# Patient Record
Sex: MALE | Race: WHITE | NOT HISPANIC OR LATINO | Employment: OTHER | URBAN - METROPOLITAN AREA
[De-identification: names, ages, dates, MRNs, and addresses within clinical notes are randomized per-mention and may not be internally consistent; named-entity substitution may affect disease eponyms.]

---

## 2019-12-08 ENCOUNTER — HOSPITAL ENCOUNTER (INPATIENT)
Facility: HOSPITAL | Age: 79
LOS: 8 days | Discharge: HOME HEALTH | DRG: 291 | End: 2019-12-17
Attending: EMERGENCY MEDICINE | Admitting: INTERNAL MEDICINE
Payer: OTHER GOVERNMENT

## 2019-12-08 DIAGNOSIS — R06.03 ACUTE RESPIRATORY DISTRESS: ICD-10-CM

## 2019-12-08 DIAGNOSIS — I50.9 CONGESTIVE HEART FAILURE, UNSPECIFIED HF CHRONICITY, UNSPECIFIED HEART FAILURE TYPE: ICD-10-CM

## 2019-12-08 DIAGNOSIS — R09.02 HYPOXIA: ICD-10-CM

## 2019-12-08 DIAGNOSIS — J96.21 ACUTE ON CHRONIC RESPIRATORY FAILURE WITH HYPOXIA: ICD-10-CM

## 2019-12-08 DIAGNOSIS — R07.9 CHEST PAIN: ICD-10-CM

## 2019-12-08 DIAGNOSIS — I48.91 ATRIAL FIBRILLATION WITH RVR: Primary | ICD-10-CM

## 2019-12-08 DIAGNOSIS — J90 PLEURAL EFFUSION: ICD-10-CM

## 2019-12-08 PROCEDURE — 84484 ASSAY OF TROPONIN QUANT: CPT

## 2019-12-08 PROCEDURE — 93010 ELECTROCARDIOGRAM REPORT: CPT | Mod: ,,, | Performed by: INTERNAL MEDICINE

## 2019-12-08 PROCEDURE — 85025 COMPLETE CBC W/AUTO DIFF WBC: CPT

## 2019-12-08 PROCEDURE — 93010 EKG 12-LEAD: ICD-10-PCS | Mod: ,,, | Performed by: INTERNAL MEDICINE

## 2019-12-08 PROCEDURE — 80053 COMPREHEN METABOLIC PANEL: CPT

## 2019-12-08 PROCEDURE — 85379 FIBRIN DEGRADATION QUANT: CPT

## 2019-12-08 PROCEDURE — 96374 THER/PROPH/DIAG INJ IV PUSH: CPT | Mod: 59

## 2019-12-08 PROCEDURE — 93005 ELECTROCARDIOGRAM TRACING: CPT

## 2019-12-08 PROCEDURE — 99291 CRITICAL CARE FIRST HOUR: CPT | Mod: 25

## 2019-12-08 PROCEDURE — 96366 THER/PROPH/DIAG IV INF ADDON: CPT

## 2019-12-08 PROCEDURE — 96365 THER/PROPH/DIAG IV INF INIT: CPT

## 2019-12-08 PROCEDURE — 83880 ASSAY OF NATRIURETIC PEPTIDE: CPT

## 2019-12-08 RX ORDER — DILTIAZEM HYDROCHLORIDE 5 MG/ML
10 INJECTION INTRAVENOUS
Status: COMPLETED | OUTPATIENT
Start: 2019-12-09 | End: 2019-12-09

## 2019-12-08 RX ORDER — DILTIAZEM HCL 1 MG/ML
5 INJECTION, SOLUTION INTRAVENOUS CONTINUOUS
Status: DISCONTINUED | OUTPATIENT
Start: 2019-12-09 | End: 2019-12-09

## 2019-12-09 PROBLEM — E78.5 HLD (HYPERLIPIDEMIA): Status: ACTIVE | Noted: 2019-12-09

## 2019-12-09 PROBLEM — J90 PLEURAL EFFUSION, BILATERAL: Status: ACTIVE | Noted: 2019-12-09

## 2019-12-09 PROBLEM — I10 ESSENTIAL HYPERTENSION: Status: ACTIVE | Noted: 2019-12-09

## 2019-12-09 PROBLEM — I50.9 ACUTE ON CHRONIC CONGESTIVE HEART FAILURE: Status: ACTIVE | Noted: 2019-12-09

## 2019-12-09 PROBLEM — I48.91 ATRIAL FIBRILLATION WITH RVR: Status: ACTIVE | Noted: 2019-12-09

## 2019-12-09 PROBLEM — J96.21 ACUTE ON CHRONIC RESPIRATORY FAILURE WITH HYPOXIA: Status: ACTIVE | Noted: 2019-12-09

## 2019-12-09 LAB
ALBUMIN SERPL BCP-MCNC: 3.3 G/DL (ref 3.5–5.2)
ALLENS TEST: ABNORMAL
ALLENS TEST: ABNORMAL
ALP SERPL-CCNC: 92 U/L (ref 55–135)
ALT SERPL W/O P-5'-P-CCNC: 23 U/L (ref 10–44)
ANION GAP SERPL CALC-SCNC: 9 MMOL/L (ref 8–16)
AORTIC VALVE REGURGITATION: ABNORMAL
AORTIC VALVE STENOSIS: ABNORMAL
AST SERPL-CCNC: 19 U/L (ref 10–40)
BASOPHILS # BLD AUTO: 0.03 K/UL (ref 0–0.2)
BASOPHILS NFR BLD: 0.2 % (ref 0–1.9)
BILIRUB SERPL-MCNC: 0.5 MG/DL (ref 0.1–1)
BNP SERPL-MCNC: 221 PG/ML (ref 0–99)
BUN SERPL-MCNC: 17 MG/DL (ref 8–23)
CALCIUM SERPL-MCNC: 9.1 MG/DL (ref 8.7–10.5)
CHLORIDE SERPL-SCNC: 97 MMOL/L (ref 95–110)
CO2 SERPL-SCNC: 36 MMOL/L (ref 23–29)
CREAT SERPL-MCNC: 0.8 MG/DL (ref 0.5–1.4)
D DIMER PPP IA.FEU-MCNC: 1.06 MG/L FEU
DELSYS: ABNORMAL
DELSYS: ABNORMAL
DIASTOLIC DYSFUNCTION: YES
DIFFERENTIAL METHOD: ABNORMAL
EOSINOPHIL # BLD AUTO: 0 K/UL (ref 0–0.5)
EOSINOPHIL NFR BLD: 0.1 % (ref 0–8)
ERYTHROCYTE [DISTWIDTH] IN BLOOD BY AUTOMATED COUNT: 13.1 % (ref 11.5–14.5)
EST. GFR  (AFRICAN AMERICAN): >60 ML/MIN/1.73 M^2
EST. GFR  (NON AFRICAN AMERICAN): >60 ML/MIN/1.73 M^2
ESTIMATED PA SYSTOLIC PRESSURE: 57.25
FIO2: 50
FIO2: 50
FLOW: 15
FLOW: 40
GLUCOSE SERPL-MCNC: 136 MG/DL (ref 70–110)
HCO3 UR-SCNC: 43.7 MMOL/L (ref 24–28)
HCO3 UR-SCNC: 43.8 MMOL/L (ref 24–28)
HCT VFR BLD AUTO: 39.6 % (ref 40–54)
HGB BLD-MCNC: 11.4 G/DL (ref 14–18)
IMM GRANULOCYTES # BLD AUTO: 0.07 K/UL (ref 0–0.04)
IMM GRANULOCYTES NFR BLD AUTO: 0.5 % (ref 0–0.5)
LACTATE SERPL-SCNC: 0.7 MMOL/L (ref 0.5–2.2)
LYMPHOCYTES # BLD AUTO: 0.7 K/UL (ref 1–4.8)
LYMPHOCYTES NFR BLD: 5.3 % (ref 18–48)
MCH RBC QN AUTO: 30.6 PG (ref 27–31)
MCHC RBC AUTO-ENTMCNC: 28.8 G/DL (ref 32–36)
MCV RBC AUTO: 107 FL (ref 82–98)
MITRAL VALVE REGURGITATION: ABNORMAL
MODE: ABNORMAL
MODE: ABNORMAL
MONOCYTES # BLD AUTO: 1 K/UL (ref 0.3–1)
MONOCYTES NFR BLD: 7.3 % (ref 4–15)
NEUTROPHILS # BLD AUTO: 11.7 K/UL (ref 1.8–7.7)
NEUTROPHILS NFR BLD: 86.6 % (ref 38–73)
NRBC BLD-RTO: 0 /100 WBC
PCO2 BLDA: 110 MMHG (ref 35–45)
PCO2 BLDA: 83.7 MMHG (ref 35–45)
PH SMN: 7.21 [PH] (ref 7.35–7.45)
PH SMN: 7.33 [PH] (ref 7.35–7.45)
PLATELET # BLD AUTO: 174 K/UL (ref 150–350)
PMV BLD AUTO: 10.6 FL (ref 9.2–12.9)
PO2 BLDA: 61 MMHG (ref 80–100)
PO2 BLDA: 85 MMHG (ref 80–100)
POC BE: 16 MMOL/L
POC BE: 18 MMOL/L
POC SATURATED O2: 87 % (ref 95–100)
POC SATURATED O2: 92 % (ref 95–100)
POTASSIUM SERPL-SCNC: 4.4 MMOL/L (ref 3.5–5.1)
PROCALCITONIN SERPL IA-MCNC: 0.05 NG/ML
PROT SERPL-MCNC: 7.5 G/DL (ref 6–8.4)
RBC # BLD AUTO: 3.72 M/UL (ref 4.6–6.2)
RETIRED EF AND QEF - SEE NOTES: 55 (ref 55–65)
SAMPLE: ABNORMAL
SAMPLE: ABNORMAL
SITE: ABNORMAL
SITE: ABNORMAL
SODIUM SERPL-SCNC: 142 MMOL/L (ref 136–145)
TRICUSPID VALVE REGURGITATION: ABNORMAL
TROPONIN I SERPL DL<=0.01 NG/ML-MCNC: <0.006 NG/ML (ref 0–0.03)
WBC # BLD AUTO: 13.5 K/UL (ref 3.9–12.7)

## 2019-12-09 PROCEDURE — 87040 BLOOD CULTURE FOR BACTERIA: CPT | Mod: 59

## 2019-12-09 PROCEDURE — 21400001 HC TELEMETRY ROOM

## 2019-12-09 PROCEDURE — 94640 AIRWAY INHALATION TREATMENT: CPT

## 2019-12-09 PROCEDURE — 99223 1ST HOSP IP/OBS HIGH 75: CPT | Mod: ,,, | Performed by: INTERNAL MEDICINE

## 2019-12-09 PROCEDURE — 99900035 HC TECH TIME PER 15 MIN (STAT)

## 2019-12-09 PROCEDURE — 94761 N-INVAS EAR/PLS OXIMETRY MLT: CPT

## 2019-12-09 PROCEDURE — 25000003 PHARM REV CODE 250: Performed by: EMERGENCY MEDICINE

## 2019-12-09 PROCEDURE — 99223 PR INITIAL HOSPITAL CARE,LEVL III: ICD-10-PCS | Mod: ,,, | Performed by: INTERNAL MEDICINE

## 2019-12-09 PROCEDURE — 25000003 PHARM REV CODE 250: Performed by: NURSE PRACTITIONER

## 2019-12-09 PROCEDURE — 83605 ASSAY OF LACTIC ACID: CPT

## 2019-12-09 PROCEDURE — 93306 2D ECHO WITH COLOR FLOW DOPPLER: ICD-10-PCS | Mod: 26,,, | Performed by: INTERNAL MEDICINE

## 2019-12-09 PROCEDURE — 63600175 PHARM REV CODE 636 W HCPCS: Performed by: FAMILY MEDICINE

## 2019-12-09 PROCEDURE — 63600175 PHARM REV CODE 636 W HCPCS: Performed by: NURSE PRACTITIONER

## 2019-12-09 PROCEDURE — 25000242 PHARM REV CODE 250 ALT 637 W/ HCPCS: Performed by: FAMILY MEDICINE

## 2019-12-09 PROCEDURE — 36415 COLL VENOUS BLD VENIPUNCTURE: CPT

## 2019-12-09 PROCEDURE — 25500020 PHARM REV CODE 255: Performed by: EMERGENCY MEDICINE

## 2019-12-09 PROCEDURE — 36600 WITHDRAWAL OF ARTERIAL BLOOD: CPT

## 2019-12-09 PROCEDURE — 27100092 HC HIGH FLOW DELIVERY CANNULA

## 2019-12-09 PROCEDURE — 27000221 HC OXYGEN, UP TO 24 HOURS

## 2019-12-09 PROCEDURE — 63600175 PHARM REV CODE 636 W HCPCS: Performed by: EMERGENCY MEDICINE

## 2019-12-09 PROCEDURE — 27100171 HC OXYGEN HIGH FLOW UP TO 24 HOURS

## 2019-12-09 PROCEDURE — 84145 PROCALCITONIN (PCT): CPT

## 2019-12-09 PROCEDURE — 25000242 PHARM REV CODE 250 ALT 637 W/ HCPCS: Performed by: NURSE PRACTITIONER

## 2019-12-09 PROCEDURE — 82803 BLOOD GASES ANY COMBINATION: CPT

## 2019-12-09 PROCEDURE — 93306 TTE W/DOPPLER COMPLETE: CPT | Mod: 26,,, | Performed by: INTERNAL MEDICINE

## 2019-12-09 PROCEDURE — 93306 TTE W/DOPPLER COMPLETE: CPT

## 2019-12-09 RX ORDER — IPRATROPIUM BROMIDE 0.5 MG/2.5ML
0.5 SOLUTION RESPIRATORY (INHALATION) EVERY 6 HOURS
Status: DISCONTINUED | OUTPATIENT
Start: 2019-12-09 | End: 2019-12-09

## 2019-12-09 RX ORDER — GUAIFENESIN 600 MG/1
600 TABLET, EXTENDED RELEASE ORAL DAILY
COMMUNITY

## 2019-12-09 RX ORDER — IPRATROPIUM BROMIDE AND ALBUTEROL SULFATE 2.5; .5 MG/3ML; MG/3ML
3 SOLUTION RESPIRATORY (INHALATION)
Status: DISCONTINUED | OUTPATIENT
Start: 2019-12-09 | End: 2019-12-12

## 2019-12-09 RX ORDER — TAMSULOSIN HYDROCHLORIDE 0.4 MG/1
0.4 CAPSULE ORAL DAILY
Status: DISCONTINUED | OUTPATIENT
Start: 2019-12-09 | End: 2019-12-17 | Stop reason: HOSPADM

## 2019-12-09 RX ORDER — FINASTERIDE 5 MG/1
5 TABLET, FILM COATED ORAL DAILY
COMMUNITY

## 2019-12-09 RX ORDER — ATORVASTATIN CALCIUM 40 MG/1
40 TABLET, FILM COATED ORAL DAILY
Status: DISCONTINUED | OUTPATIENT
Start: 2019-12-09 | End: 2019-12-17 | Stop reason: HOSPADM

## 2019-12-09 RX ORDER — DILTIAZEM HCL/D5W 125 MG/125
12.5 PLASTIC BAG, INJECTION (ML) INTRAVENOUS CONTINUOUS
Status: DISCONTINUED | OUTPATIENT
Start: 2019-12-09 | End: 2019-12-10

## 2019-12-09 RX ORDER — DABIGATRAN ETEXILATE 150 MG/1
150 CAPSULE ORAL 2 TIMES DAILY
Status: DISCONTINUED | OUTPATIENT
Start: 2019-12-09 | End: 2019-12-17 | Stop reason: HOSPADM

## 2019-12-09 RX ORDER — DABIGATRAN ETEXILATE 150 MG/1
150 CAPSULE ORAL 2 TIMES DAILY
COMMUNITY

## 2019-12-09 RX ORDER — IPRATROPIUM BROMIDE AND ALBUTEROL SULFATE 2.5; .5 MG/3ML; MG/3ML
3 SOLUTION RESPIRATORY (INHALATION) EVERY 6 HOURS PRN
Status: DISCONTINUED | OUTPATIENT
Start: 2019-12-09 | End: 2019-12-17 | Stop reason: HOSPADM

## 2019-12-09 RX ORDER — ATORVASTATIN CALCIUM 40 MG/1
40 TABLET, FILM COATED ORAL DAILY
COMMUNITY

## 2019-12-09 RX ORDER — OMEPRAZOLE 20 MG/1
20 CAPSULE, DELAYED RELEASE ORAL DAILY
COMMUNITY

## 2019-12-09 RX ORDER — TIOTROPIUM BROMIDE 18 UG/1
2.5 CAPSULE ORAL; RESPIRATORY (INHALATION) DAILY
COMMUNITY

## 2019-12-09 RX ORDER — FUROSEMIDE 10 MG/ML
40 INJECTION INTRAMUSCULAR; INTRAVENOUS 2 TIMES DAILY
Status: DISCONTINUED | OUTPATIENT
Start: 2019-12-09 | End: 2019-12-17

## 2019-12-09 RX ORDER — PANTOPRAZOLE SODIUM 40 MG/1
40 TABLET, DELAYED RELEASE ORAL DAILY
Status: DISCONTINUED | OUTPATIENT
Start: 2019-12-09 | End: 2019-12-17 | Stop reason: HOSPADM

## 2019-12-09 RX ORDER — BUDESONIDE 0.5 MG/2ML
0.5 INHALANT ORAL EVERY 12 HOURS
Status: DISCONTINUED | OUTPATIENT
Start: 2019-12-09 | End: 2019-12-17 | Stop reason: HOSPADM

## 2019-12-09 RX ORDER — MULTIVITAMIN
1 TABLET ORAL DAILY
COMMUNITY

## 2019-12-09 RX ORDER — METOPROLOL SUCCINATE 50 MG/1
100 TABLET, EXTENDED RELEASE ORAL DAILY
Status: DISCONTINUED | OUTPATIENT
Start: 2019-12-09 | End: 2019-12-17 | Stop reason: HOSPADM

## 2019-12-09 RX ORDER — TIOTROPIUM BROMIDE 18 UG/1
2.5 CAPSULE ORAL; RESPIRATORY (INHALATION) DAILY
Status: DISCONTINUED | OUTPATIENT
Start: 2019-12-09 | End: 2019-12-09

## 2019-12-09 RX ORDER — FINASTERIDE 5 MG/1
5 TABLET, FILM COATED ORAL DAILY
Status: DISCONTINUED | OUTPATIENT
Start: 2019-12-09 | End: 2019-12-17 | Stop reason: HOSPADM

## 2019-12-09 RX ORDER — METOPROLOL SUCCINATE 100 MG/1
100 TABLET, EXTENDED RELEASE ORAL DAILY
COMMUNITY

## 2019-12-09 RX ORDER — FUROSEMIDE 10 MG/ML
40 INJECTION INTRAMUSCULAR; INTRAVENOUS
Status: COMPLETED | OUTPATIENT
Start: 2019-12-09 | End: 2019-12-09

## 2019-12-09 RX ORDER — TAMSULOSIN HYDROCHLORIDE 0.4 MG/1
0.4 CAPSULE ORAL DAILY
COMMUNITY

## 2019-12-09 RX ADMIN — METHYLPREDNISOLONE SODIUM SUCCINATE 80 MG: 40 INJECTION, POWDER, FOR SOLUTION INTRAMUSCULAR; INTRAVENOUS at 10:12

## 2019-12-09 RX ADMIN — FUROSEMIDE 40 MG: 10 INJECTION, SOLUTION INTRAMUSCULAR; INTRAVENOUS at 08:12

## 2019-12-09 RX ADMIN — METOPROLOL SUCCINATE 100 MG: 50 TABLET, FILM COATED, EXTENDED RELEASE ORAL at 08:12

## 2019-12-09 RX ADMIN — Medication 12.5 MG/HR: at 07:12

## 2019-12-09 RX ADMIN — Medication 5 MG/HR: at 12:12

## 2019-12-09 RX ADMIN — AZITHROMYCIN MONOHYDRATE 500 MG: 500 INJECTION, POWDER, LYOPHILIZED, FOR SOLUTION INTRAVENOUS at 12:12

## 2019-12-09 RX ADMIN — IOHEXOL 100 ML: 350 INJECTION, SOLUTION INTRAVENOUS at 01:12

## 2019-12-09 RX ADMIN — ATORVASTATIN CALCIUM 40 MG: 40 TABLET, FILM COATED ORAL at 08:12

## 2019-12-09 RX ADMIN — IPRATROPIUM BROMIDE AND ALBUTEROL SULFATE 3 ML: .5; 3 SOLUTION RESPIRATORY (INHALATION) at 11:12

## 2019-12-09 RX ADMIN — FUROSEMIDE 40 MG: 10 INJECTION, SOLUTION INTRAMUSCULAR; INTRAVENOUS at 12:12

## 2019-12-09 RX ADMIN — DILTIAZEM HYDROCHLORIDE 10 MG: 5 INJECTION INTRAVENOUS at 12:12

## 2019-12-09 RX ADMIN — METHYLPREDNISOLONE SODIUM SUCCINATE 80 MG: 40 INJECTION, POWDER, FOR SOLUTION INTRAMUSCULAR; INTRAVENOUS at 09:12

## 2019-12-09 RX ADMIN — IPRATROPIUM BROMIDE AND ALBUTEROL SULFATE 3 ML: .5; 3 SOLUTION RESPIRATORY (INHALATION) at 04:12

## 2019-12-09 RX ADMIN — BUDESONIDE 0.5 MG: 0.5 INHALANT RESPIRATORY (INHALATION) at 07:12

## 2019-12-09 RX ADMIN — IPRATROPIUM BROMIDE AND ALBUTEROL SULFATE 3 ML: .5; 3 SOLUTION RESPIRATORY (INHALATION) at 07:12

## 2019-12-09 RX ADMIN — FINASTERIDE 5 MG: 5 TABLET, FILM COATED ORAL at 08:12

## 2019-12-09 RX ADMIN — DABIGATRAN ETEXILATE MESYLATE 150 MG: 150 CAPSULE ORAL at 08:12

## 2019-12-09 RX ADMIN — IPRATROPIUM BROMIDE 0.5 MG: 0.5 SOLUTION RESPIRATORY (INHALATION) at 07:12

## 2019-12-09 RX ADMIN — TAMSULOSIN HYDROCHLORIDE 0.4 MG: 0.4 CAPSULE ORAL at 08:12

## 2019-12-09 RX ADMIN — Medication 12.5 MG/HR: at 02:12

## 2019-12-09 RX ADMIN — FUROSEMIDE 40 MG: 10 INJECTION, SOLUTION INTRAMUSCULAR; INTRAVENOUS at 05:12

## 2019-12-09 RX ADMIN — PANTOPRAZOLE SODIUM 40 MG: 40 TABLET, DELAYED RELEASE ORAL at 08:12

## 2019-12-09 RX ADMIN — Medication 12.5 MG/HR: at 10:12

## 2019-12-09 RX ADMIN — THERA TABS 1 TABLET: TAB at 08:12

## 2019-12-09 RX ADMIN — DABIGATRAN ETEXILATE MESYLATE 150 MG: 150 CAPSULE ORAL at 09:12

## 2019-12-09 NOTE — H&P
[facility] Hospital Medicine  History & Physical    Patient Name: Nicolás Marin  MRN: [id number]  Admission Date: 12/8/2019  Attending Physician: Neftali Mendez MD   Primary Care Provider: Yuli Lau MD         Patient information was obtained from patient, relative(s) and ER records.     Subjective:     Principal Problem: Acute on chronic respiratory failure with hypoxia    Chief Complaint:   Chief Complaint   Patient presents with    Shortness of Breath     +dfficulty breathing at home. on home oxygen 2L, Given one neb and NGT sub PTA        HPI: Mr Marin is a 79 year old male with PMHx of COPD on home O 2, HTN, A Fib on Pradaxa who presented to [facility] with complaints of progressive shortness of breath that has continue to worsen.  Denies associated symptoms chest pain, shortness of breath, fever, chills, nausea or vomiting. [facility] and is a poor historian. EKG in the Emergency Room showed A Fib with RVR rate in of 135, he was started of Cardizem drip in the Emergency Room. CT of chest showed bilateral plural effusions, Rt greater that left. . Troponin negative. He is a full code, granddaughter Katharine, is surrogate decision make. He is being admitted to telemety under the care of Hospital Medicine.      Past Medical History:   Diagnosis Date    Anticoagulant long-term use     Atrial fibrillation     COPD (chronic obstructive pulmonary disease)     History of cardioversion     Hypertension        History reviewed. No pertinent surgical history.    Review of patient's allergies indicates:   Allergen Reactions    Penicillins        No current facility-administered medications on file prior to encounter.      Current Outpatient Medications on File Prior to Encounter   Medication Sig    albuterol sulfate (PROAIR DIGIHALER) 90 mcg/actuation aebs Inhale into the lungs.    atorvastatin (LIPITOR) 40 MG tablet Take 40 mg by mouth once daily.    dabigatran  "etexilate (PRADAXA) 150 mg Cap Take 150 mg by mouth 2 (two) times daily. "Do NOT break, chew, or open capsules."     finasteride (PROSCAR) 5 mg tablet Take 5 mg by mouth once daily.    guaiFENesin (MUCINEX) 600 mg 12 hr tablet Take 600 mg by mouth once daily.    ipratropium-albuterol (COMBIVENT RESPIMAT)  mcg/actuation inhaler Inhale 1 puff into the lungs every 6 (six) hours as needed for Wheezing. Rescue    metoprolol succinate (TOPROL-XL) 100 MG 24 hr tablet Take 100 mg by mouth once daily.    mometasone (ASMANEX TWISTHALER) 220 mcg/ actuation (30) inhaler Inhale 2 puffs into the lungs once daily. Controller    multivitamin (THERAGRAN) per tablet Take 1 tablet by mouth once daily.    omeprazole (PRILOSEC) 20 MG capsule Take 20 mg by mouth once daily.    tamsulosin (FLOMAX) 0.4 mg Cap Take 0.4 mg by mouth once daily.    tiotropium (SPIRIVA) 18 mcg inhalation capsule Inhale 2.5 mcg into the lungs once daily. Controller     Family History     Family history is unknown by patient.        Tobacco Use    Smoking status: Former Smoker     Packs/day: 2.00   Substance and Sexual Activity    Alcohol use: Not Currently    Drug use: Never    Sexual activity: Not on file     Review of Systems   Constitutional: Positive for fatigue. Negative for chills, diaphoresis and fever.   HENT: Negative for congestion, ear discharge, rhinorrhea, sinus pressure, sore throat and trouble swallowing.    Eyes: Negative for discharge and visual disturbance.   Respiratory: Positive for cough and shortness of breath. Negative for apnea, choking, chest tightness, wheezing and stridor.    Cardiovascular: Negative for chest pain, palpitations and leg swelling.   Gastrointestinal: Negative for abdominal distention, abdominal pain, diarrhea, nausea and vomiting.   Endocrine: Negative for cold intolerance and heat intolerance.   Genitourinary: Negative for dysuria, frequency and hematuria.   Musculoskeletal: Negative for arthralgias, " back pain, myalgias and neck pain.   Skin: Negative for pallor and rash.   Neurological: Positive for weakness. Negative for dizziness, seizures, syncope and headaches.   Psychiatric/Behavioral: Negative for agitation, confusion and sleep disturbance.     Objective:     Vital Signs (Most Recent):  Pulse: 93 (12/09/19 0217)  Resp: (!) 24 (12/09/19 0217)  BP: 128/73 (12/09/19 0217)  SpO2: (!) 90 % (12/09/19 0217) Vital Signs (24h Range):  Pulse:  [] 93  Resp:  [21-36] 24  SpO2:  [69 %-100 %] 90 %  BP: (128-139)/(58-97) 128/73        There is no height or weight on file to calculate BMI.    Physical Exam   Constitutional: No distress.   HENT:   Head: Normocephalic and atraumatic.   Eyes: Right eye exhibits no discharge. Left eye exhibits no discharge.   Neck: No JVD present.   Cardiovascular: An irregular rhythm present. Exam reveals no gallop and no friction rub.   No murmur heard.  Pulmonary/Chest: No stridor. No respiratory distress. He has decreased breath sounds in the right lower field and the left lower field. He has no wheezes. He has rhonchi. He has no rales. He exhibits no tenderness.   Abdominal: He exhibits no distension and no mass. There is no tenderness. There is no rebound and no guarding.   Musculoskeletal: He exhibits no edema or deformity.   Neurological: He is alert.   Skin: He is not diaphoretic.   Nursing note and vitals reviewed.                Results for orders placed or performed during the hospital encounter of 12/08/19   CBC auto differential   Result Value Ref Range    WBC 13.50 (H) 3.90 - 12.70 K/uL    RBC 3.72 (L) 4.60 - 6.20 M/uL    Hemoglobin 11.4 (L) 14.0 - 18.0 g/dL    Hematocrit 39.6 (L) 40.0 - 54.0 %    Mean Corpuscular Volume 107 (H) 82 - 98 fL    Mean Corpuscular Hemoglobin 30.6 27.0 - 31.0 pg    Mean Corpuscular Hemoglobin Conc 28.8 (L) 32.0 - 36.0 g/dL    RDW 13.1 11.5 - 14.5 %    Platelets 174 150 - 350 K/uL    MPV 10.6 9.2 - 12.9 fL    Immature Granulocytes 0.5 0.0 - 0.5  %    Gran # (ANC) 11.7 (H) 1.8 - 7.7 K/uL    Immature Grans (Abs) 0.07 (H) 0.00 - 0.04 K/uL    Lymph # 0.7 (L) 1.0 - 4.8 K/uL    Mono # 1.0 0.3 - 1.0 K/uL    Eos # 0.0 0.0 - 0.5 K/uL    Baso # 0.03 0.00 - 0.20 K/uL    nRBC 0 0 /100 WBC    Gran% 86.6 (H) 38.0 - 73.0 %    Lymph% 5.3 (L) 18.0 - 48.0 %    Mono% 7.3 4.0 - 15.0 %    Eosinophil% 0.1 0.0 - 8.0 %    Basophil% 0.2 0.0 - 1.9 %    Differential Method Automated    Comprehensive metabolic panel   Result Value Ref Range    Sodium 142 136 - 145 mmol/L    Potassium 4.4 3.5 - 5.1 mmol/L    Chloride 97 95 - 110 mmol/L    CO2 36 (H) 23 - 29 mmol/L    Glucose 136 (H) 70 - 110 mg/dL    BUN, Bld 17 8 - 23 mg/dL    Creatinine 0.8 0.5 - 1.4 mg/dL    Calcium 9.1 8.7 - 10.5 mg/dL    Total Protein 7.5 6.0 - 8.4 g/dL    Albumin 3.3 (L) 3.5 - 5.2 g/dL    Total Bilirubin 0.5 0.1 - 1.0 mg/dL    Alkaline Phosphatase 92 55 - 135 U/L    AST 19 10 - 40 U/L    ALT 23 10 - 44 U/L    Anion Gap 9 8 - 16 mmol/L    eGFR if African American >60 >60 mL/min/1.73 m^2    eGFR if non African American >60 >60 mL/min/1.73 m^2   Troponin I #1   Result Value Ref Range    Troponin I <0.006 0.000 - 0.026 ng/mL   B-Type natriuretic peptide (BNP)   Result Value Ref Range     (H) 0 - 99 pg/mL   D dimer, quantitative   Result Value Ref Range    D-Dimer 1.06 (H) <0.50 mg/L FEU   Lactic acid, plasma   Result Value Ref Range    Lactate (Lactic Acid) 0.7 0.5 - 2.2 mmol/L       Significant Imaging:    Imaging Results          CTA Chest Non-Coronary (PE Study) (In process)                X-Ray Chest AP Portable (Preliminary result)  Result time 12/09/19 00:34:12    ED Interpretation by Jackson Burns MD (12/09/19 00:34:12, [facility], Emergency Medicine)    Vascular Congestion                            Assessment/Plan:     Acute on chronic respiratory failure with hypoxia  Admit to Telemetry   Pt on home O 2 at 2-3 L   Venti mask at 50 %, wean as tolerated  Nebs   Probable  related to CHF and plural effusions   Lasix 40 mg IV BID        Acute on chronic congestive heart failure  Strict I & O   Daily weights  Fluid restriction   Low sodium diet   Check 2 D Echo             Atrial fibrillation with RVR  Admitted with A Fib with RVR   Cardizem drip   Continue B blocker   Cardiology Consult   Continue Pradaxa         Pleural effusion, bilateral  CT shows bilateral plural effusion, more right than left   Lasix 40 mg IV BID       HLD (hyperlipidemia)  Continue Statin       Essential hypertension  Continue B blocker         VTE Risk Mitigation (From admission, onward)         Ordered     dabigatran etexilate capsule 150 mg  2 times daily      12/09/19 0341                   Phi Santizo NP  Department of Hospital Medicine   [facility]

## 2019-12-09 NOTE — NURSING
Patient AAOx4. Vital signs stable. Oxygen saturation low at 88. Nonrebreather mask applied. Cardizem infusion in place. Cardiac monitor applied and verified with the monitor tech. Will continue to monitor closely.

## 2019-12-09 NOTE — ED PROVIDER NOTES
SCRIBE #1 NOTE: I, Janice Frankel, am scribing for, and in the presence of, Jackson Burns MD. I have scribed the entire note.       History     Chief Complaint   Patient presents with    Shortness of Breath     +dfficulty breathing at home. on home oxygen 2L, Given one neb and NGT sub PTA     Review of patient's allergies indicates:   Allergen Reactions    Penicillins          History of Present Illness     HPI    12/9/2019, 12:02 AM  History obtained from the patient      History of Present Illness: Nicolás Marin is a 79 y.o. male patient with a PMHx of A fib, who presents to the Emergency Department for evaluation of SOB which onset gradually 3 months ago. Pt states sxs have been present for almost 2 years, but sxs have recently worsened. Symptoms are constant and moderate in severity. No mitigating or exacerbating factors reported. No associated sxs . Patient denies any fever/chills, fatigue, congestion, sore throat, cough, CP, palpitations, n/v/d, dysuria, hematuria, back pain, rash, HA, dizziness, bruises and blds easily, and all other sxs at this time. No prior Tx reported. No further complaints or concerns at this time.     Arrival mode: AASI    PCP: Yuli Lau MD        Past Medical History:  Past Medical History:   Diagnosis Date    Anticoagulant long-term use     Atrial fibrillation     COPD (chronic obstructive pulmonary disease)     History of cardioversion     Hypertension        Past Surgical History:  History reviewed. No pertinent surgical history.      Family History:  Family History   Problem Relation Age of Onset    No Known Problems Mother     No Known Problems Father     No Known Problems Sister     No Known Problems Brother        Social History:  Social History     Tobacco Use    Smoking status: Former Smoker     Packs/day: 2.00    Smokeless tobacco: Never Used   Substance and Sexual Activity    Alcohol use: Not Currently    Drug use: Never    Sexual  activity: Not Currently        Review of Systems     Review of Systems   Constitutional: Negative for chills, fatigue and fever.   HENT: Negative for congestion and sore throat.    Respiratory: Positive for shortness of breath. Negative for cough.    Cardiovascular: Negative for chest pain and palpitations.   Gastrointestinal: Negative for diarrhea, nausea and vomiting.   Genitourinary: Negative for dysuria and hematuria.   Musculoskeletal: Negative for back pain.   Skin: Negative for rash.   Neurological: Negative for dizziness and headaches.   Hematological: Does not bruise/bleed easily.   All other systems reviewed and are negative.     Physical Exam     Initial Vitals   BP Pulse Resp Temp SpO2   12/08/19 2340 12/08/19 2340 12/08/19 2340 12/09/19 0301 12/08/19 2340   (!) 139/96 106 (!) 21 99.1 °F (37.3 °C) 96 %      MAP       --                 Physical Exam  Nursing Notes and Vital Signs Reviewed.  Constitutional: Patient is in moderate distress. Well-developed and well-nourished.  Head: Atraumatic. Normocephalic.  Eyes: PERRL. EOM intact. Conjunctivae are not pale. No scleral icterus.  ENT: Mucous membranes are moist. Oropharynx is clear and symmetric.    Neck: Supple. Full ROM. No lymphadenopathy.  Cardiovascular: Irregularly irregular. No murmurs, rubs, or gallops. Distal pulses are 2+ and symmetric.  Pulmonary/Chest: Moderate respiratory distress. Crackles bilaterally.   Abdominal: Soft and non-distended.  There is no tenderness.  No rebound, guarding, or rigidity. Good bowel sounds.  Genitourinary: No CVA tenderness  Musculoskeletal: Moves all extremities. No obvious deformities. +2 edema. No calf tenderness.  Skin: Warm and dry.  Neurological:  Alert, awake, and appropriate.  Normal speech.  No acute focal neurological deficits are appreciated.  Psychiatric: Normal affect. Good eye contact. Appropriate in content.     ED Course   Critical Care  Date/Time: 12/9/2019 1:02 AM  Performed by: Jackson RAMIREZ  "MD Grant  Authorized by: Jackson Burns MD   Direct patient critical care time: 13 minutes  Additional history critical care time: 7 minutes  Ordering / reviewing critical care time: 8 minutes  Documentation critical care time: 6 minutes  Consulting other physicians critical care time: 6 minutes  Total critical care time (exclusive of procedural time) : 40 minutes  Critical care time was exclusive of separately billable procedures and treating other patients and teaching time.  Critical care was necessary to treat or prevent imminent or life-threatening deterioration of the following conditions: cardiac failure.  Critical care was time spent personally by me on the following activities: blood draw for specimens, development of treatment plan with patient or surrogate, discussions with consultants, evaluation of patient's response to treatment, examination of patient, obtaining history from patient or surrogate, ordering and performing treatments and interventions, ordering and review of laboratory studies, ordering and review of radiographic studies, pulse oximetry, re-evaluation of patient's condition and review of old charts.        ED Vital Signs:  Vitals:    12/09/19 0400 12/09/19 0459 12/09/19 0506 12/09/19 0729   BP: (!) 144/73  (!) 143/72    Pulse: 83  70 68   Resp: (!) 28  (!) 22 20   Temp:   98.1 °F (36.7 °C)    TempSrc:       SpO2: (!) 91%  (!) 90% (!) 92%   Weight:  93.8 kg (206 lb 12.7 oz)     Height:  6' 1" (1.854 m)      12/09/19 0739 12/09/19 0945 12/09/19 1000 12/09/19 1149   BP: (!) 142/70      Pulse: 76 70 77 76   Resp: 20 20  20   Temp: 97.6 °F (36.4 °C)      TempSrc: Oral      SpO2: (!) 93%   (!) 92%   Weight:       Height:        12/09/19 1201 12/09/19 1215 12/09/19 1317 12/09/19 1400   BP: 109/68      Pulse: 81 71     Resp: 16      Temp: 97.2 °F (36.2 °C)      TempSrc: Oral      SpO2: (!) 93% (!) 86% (!) 85% 98%   Weight:       Height:        12/09/19 1505 12/09/19 1526 12/09/19 " 1536   BP:  (!) 118/58    Pulse: 79 81    Resp:  16    Temp:  97.5 °F (36.4 °C)    TempSrc:  Oral    SpO2: 96% 98% (!) 92%   Weight:      Height:          Abnormal Lab Results:  Labs Reviewed   CBC W/ AUTO DIFFERENTIAL - Abnormal; Notable for the following components:       Result Value    WBC 13.50 (*)     RBC 3.72 (*)     Hemoglobin 11.4 (*)     Hematocrit 39.6 (*)     Mean Corpuscular Volume 107 (*)     Mean Corpuscular Hemoglobin Conc 28.8 (*)     Gran # (ANC) 11.7 (*)     Immature Grans (Abs) 0.07 (*)     Lymph # 0.7 (*)     Gran% 86.6 (*)     Lymph% 5.3 (*)     All other components within normal limits   COMPREHENSIVE METABOLIC PANEL - Abnormal; Notable for the following components:    CO2 36 (*)     Glucose 136 (*)     Albumin 3.3 (*)     All other components within normal limits   B-TYPE NATRIURETIC PEPTIDE - Abnormal; Notable for the following components:     (*)     All other components within normal limits   D DIMER, QUANTITATIVE - Abnormal; Notable for the following components:    D-Dimer 1.06 (*)     All other components within normal limits   TROPONIN I   LACTIC ACID, PLASMA        All Lab Results:  Results for orders placed or performed during the hospital encounter of 12/08/19   CBC auto differential   Result Value Ref Range    WBC 13.50 (H) 3.90 - 12.70 K/uL    RBC 3.72 (L) 4.60 - 6.20 M/uL    Hemoglobin 11.4 (L) 14.0 - 18.0 g/dL    Hematocrit 39.6 (L) 40.0 - 54.0 %    Mean Corpuscular Volume 107 (H) 82 - 98 fL    Mean Corpuscular Hemoglobin 30.6 27.0 - 31.0 pg    Mean Corpuscular Hemoglobin Conc 28.8 (L) 32.0 - 36.0 g/dL    RDW 13.1 11.5 - 14.5 %    Platelets 174 150 - 350 K/uL    MPV 10.6 9.2 - 12.9 fL    Immature Granulocytes 0.5 0.0 - 0.5 %    Gran # (ANC) 11.7 (H) 1.8 - 7.7 K/uL    Immature Grans (Abs) 0.07 (H) 0.00 - 0.04 K/uL    Lymph # 0.7 (L) 1.0 - 4.8 K/uL    Mono # 1.0 0.3 - 1.0 K/uL    Eos # 0.0 0.0 - 0.5 K/uL    Baso # 0.03 0.00 - 0.20 K/uL    nRBC 0 0 /100 WBC    Gran% 86.6 (H)  38.0 - 73.0 %    Lymph% 5.3 (L) 18.0 - 48.0 %    Mono% 7.3 4.0 - 15.0 %    Eosinophil% 0.1 0.0 - 8.0 %    Basophil% 0.2 0.0 - 1.9 %    Differential Method Automated    Comprehensive metabolic panel   Result Value Ref Range    Sodium 142 136 - 145 mmol/L    Potassium 4.4 3.5 - 5.1 mmol/L    Chloride 97 95 - 110 mmol/L    CO2 36 (H) 23 - 29 mmol/L    Glucose 136 (H) 70 - 110 mg/dL    BUN, Bld 17 8 - 23 mg/dL    Creatinine 0.8 0.5 - 1.4 mg/dL    Calcium 9.1 8.7 - 10.5 mg/dL    Total Protein 7.5 6.0 - 8.4 g/dL    Albumin 3.3 (L) 3.5 - 5.2 g/dL    Total Bilirubin 0.5 0.1 - 1.0 mg/dL    Alkaline Phosphatase 92 55 - 135 U/L    AST 19 10 - 40 U/L    ALT 23 10 - 44 U/L    Anion Gap 9 8 - 16 mmol/L    eGFR if African American >60 >60 mL/min/1.73 m^2    eGFR if non African American >60 >60 mL/min/1.73 m^2   Troponin I #1   Result Value Ref Range    Troponin I <0.006 0.000 - 0.026 ng/mL   B-Type natriuretic peptide (BNP)   Result Value Ref Range     (H) 0 - 99 pg/mL   D dimer, quantitative   Result Value Ref Range    D-Dimer 1.06 (H) <0.50 mg/L FEU   Lactic acid, plasma   Result Value Ref Range    Lactate (Lactic Acid) 0.7 0.5 - 2.2 mmol/L   Procalcitonin   Result Value Ref Range    Procalcitonin 0.05 <0.25 ng/mL   ISTAT PROCEDURE   Result Value Ref Range    POC PH 7.206 (LL) 7.35 - 7.45    POC PCO2 110.0 (HH) 35 - 45 mmHg    POC PO2 85 80 - 100 mmHg    POC HCO3 43.7 (H) 24 - 28 mmol/L    POC BE 16 -2 to 2 mmol/L    POC SATURATED O2 92 (L) 95 - 100 %    Sample ARTERIAL     Site LR     Allens Test Pass     DelSys Venti Mask     Mode SPONT     Flow 15     FiO2 50    ISTAT PROCEDURE   Result Value Ref Range    POC PH 7.327 (L) 7.35 - 7.45    POC PCO2 83.7 (HH) 35 - 45 mmHg    POC PO2 61 (L) 80 - 100 mmHg    POC HCO3 43.8 (H) 24 - 28 mmol/L    POC BE 18 -2 to 2 mmol/L    POC SATURATED O2 87 (L) 95 - 100 %    Sample ARTERIAL     Site LR     Allens Test Pass     DelSys Nasal Can     Mode SPONT     Flow 40     FiO2 50         Imaging Results:  Imaging Results          CTA Chest Non-Coronary (PE Study) (Final result)  Result time 12/09/19 06:26:00    Final result by Ramiro Driscoll MD (12/09/19 06:26:00)                 Impression:      No evidence of pulmonary embolism.  Findings suggestive of pulmonary arterial hypertension.    Moderate bilateral pleural effusions with diffuse interstitial and ground-glass opacities throughout the lungs suggesting pulmonary edema.  Superimposed infection not excluded with more consolidative process at the lung bases.    Cardiomegaly.    See additional findings above    All CT scans at this facility use dose modulation, iterative reconstruction and/or weight based dosing when appropriate to reduce radiation dose to as low as reasonably achievable.      Electronically signed by: Ramiro Driscoll MD  Date:    12/09/2019  Time:    06:26             Narrative:    EXAMINATION:  CTA CHEST NON CORONARY    CLINICAL HISTORY:  Chest pain and shortness of breath    TECHNIQUE:  After the intravenous administration of 100 cc of Omni 350 nonionic contrast using CT pulmonary angio technique, 1.25  mm axial images were acquired using helical CT technique from the lung apices through costophrenic sulci.  Axial mips, sagittal and coronal reformats were also submitted for interpretation.    COMPARISON:  Chest x-ray dated 12/09/2019    FINDINGS:  -Pulmonary arteries: Pulmonary arteries are well opacified.  No evidence of pulmonary embolism.  Prominence of the pulmonary artery suggests pulmonary arterial hypertension.  No right heart strain is identified.    -Lungs: Patchy ground-glass and interstitial infiltrates are seen throughout the lungs greatest within the lower lobes and left upper lobe.  More focal consolidative processes are seen within the lower lobes.  Severe emphysematous changes are noted most pronounced within the upper lobes.    -Pleura: Moderate bilateral pleural effusions.  Pleural calcifications are  seen bilaterally without definite pleural plaques.  Findings can be seen with prior hemothorax or prior pleurodesis.  Asbestos related pleural disease is thought to be less likely.    -Mediastinum/Adriana:Mediastinal adenopathy noted measuring up to 1.4 cm which may be reactive in can be assessed on follow-up studies.  Fluid within the mid esophagus can be seen with reflux disease.    -Axilla: No adenopathy.    -Thyroid: Normal lower gland.    -Heart/Aorta: Heart size is enlarged.  Severe coronary artery disease.  Small pericardial effusion. Aorta ectatic within the ascending aorta measuring up to 4.6 cm.  Demonstrates severe atherosclerotic disease.    -Bones/Chest Wall: Intact with advanced degenerative changes of the thoracic spine and shoulder joints.  Diffuse osteopenia.  Anasarca.    -Upper Abdomen: Unremarkable                               X-Ray Chest AP Portable (Final result)  Result time 12/09/19 07:46:00    Final result by Ramiro Driscoll MD (12/09/19 07:46:00)                 Impression:      See findings above      Electronically signed by: Ramiro Driscoll MD  Date:    12/09/2019  Time:    07:46             Narrative:    EXAMINATION:  XR CHEST AP PORTABLE    CLINICAL HISTORY:  Chest Pain;    FINDINGS:  Single view of the chest.    Cardiomegaly, pulmonary vascular congestion and bilateral interstitial opacities suggest CHF with pulmonary edema.  More focal consolidations at the lung bases which could reflect airspace disease or aspiration, right greater than left, not excluded.  Small to moderate right and small left pleural effusion.  Bones demonstrate degenerative changes.  Aorta demonstrates atherosclerotic disease.                                 The EKG was ordered, reviewed, and independently interpreted by the ED provider.  Interpretation time: 23:57  Rate: 135 BPM  Rhythm: atrial fibrillation with rapid ventricular response  Interpretation: Nonspecific T wave abnormality. No STEMI.           The  Emergency Provider reviewed the vital signs and test results, which are outlined above.     ED Discussion     1:58 AM: Re-evaluated pt. I have discussed test results, shared treatment plan, and the need for admission with patient and family at bedside. Pt and family express understanding at this time and agree with all information. All questions answered. Pt and family have no further questions or concerns at this time. Pt is ready for admit.      1:58 AM: Discussed case with Phi Santizo NP (Hospital Medicine). Dr. Mendez agrees with current care and management of pt and accepts admission pending CT results.   Admitting Service: Hospital Medicine  Admitting Physician: Dr. Mendez  Admit to: Obs tele                   ED Medication(s):  Medications   diltiaZEM 125 mg in dextrose 5% 125 mL infusion (non-titrating) (12.5 mg/hr Intravenous New Bag 12/9/19 1003)   atorvastatin tablet 40 mg (40 mg Oral Given 12/9/19 0846)   dabigatran etexilate capsule 150 mg (150 mg Oral Given 12/9/19 0845)   finasteride tablet 5 mg (5 mg Oral Given 12/9/19 0846)   tamsulosin 24 hr capsule 0.4 mg (0.4 mg Oral Given 12/9/19 0845)   pantoprazole EC tablet 40 mg (40 mg Oral Given 12/9/19 0846)   furosemide injection 40 mg (40 mg Intravenous Given 12/9/19 0847)   albuterol-ipratropium 2.5 mg-0.5 mg/3 mL nebulizer solution 3 mL (has no administration in time range)   budesonide nebulizer solution 0.5 mg (0.5 mg Nebulization Given 12/9/19 0729)   metoprolol succinate (TOPROL-XL) 24 hr tablet 100 mg (100 mg Oral Given 12/9/19 0846)   multivitamin tablet (1 tablet Oral Given 12/9/19 0847)   azithromycin 500 mg in dextrose 5 % 250 mL IVPB (ready to mix system) (500 mg Intravenous New Bag 12/9/19 1206)   albuterol-ipratropium 2.5 mg-0.5 mg/3 mL nebulizer solution 3 mL (3 mLs Nebulization Given 12/9/19 1149)   methylPREDNISolone sodium succinate injection 80 mg (80 mg Intravenous Given 12/9/19 1039)   diltiaZEM injection 10 mg (10 mg  Intravenous Given 12/9/19 0001)   furosemide injection 40 mg (40 mg Intravenous Given 12/9/19 0043)   iohexol (OMNIPAQUE 350) injection 100 mL (100 mLs Intravenous Given 12/9/19 0138)       Current Discharge Medication List                  Scribe Attestation:   Scribe #1: I performed the above scribed service and the documentation accurately describes the services I performed. I attest to the accuracy of the note.     Attending:   Physician Attestation Statement for Scribe #1: I, Jackson Burns MD, personally performed the services described in this documentation, as scribed by Janice Frankel, in my presence, and it is both accurate and complete.         Clinical Impression       ICD-10-CM ICD-9-CM   1. Atrial fibrillation with RVR I48.91 427.31   2. Chest pain R07.9 786.50   3. Congestive heart failure, unspecified HF chronicity, unspecified heart failure type I50.9 428.0   4. Hypoxia R09.02 799.02   5. Acute respiratory distress R06.03 518.82   6. Pleural effusion J90 511.9       Disposition:   Disposition: Admitted  Condition: Serious       Jackson Burns MD  12/09/19 3638

## 2019-12-09 NOTE — ASSESSMENT & PLAN NOTE
-Secondary to diastolic CHF and underlying COPD  -Continue IV diuresis  -Continue nebs  -Consider steroids

## 2019-12-09 NOTE — HPI
Mr. Marin is an 79 year old male patient whose current medical conditions include COPD (on home O2), HTN, afib (on Pradaxa) who presented to [facility] ED yesterday evening with a chief complaint of worsening SOB over the past 1-2 weeks. Associated symptoms included non-productive cough and LE edema. Patient denied any associated harry chest pain, fever, chills, cough, near syncope, or syncope. Initial workup in ED revealed BNP of 221. EKG showed afib with RVR, rate 135 bpm and patient was subsequently placed on cardizem gtt and admitted for further evaluation and treatment. Cardiology consulted to assist with management. Patient seen and examined today, sitting up in bed. More SOB, placed on Vapotherm this AM. Denies chest pain. [facility]. Tries to be compliant with meds but does report difficulty swallowing pills. Chart reviewed. CT of chest showed bilateral pleural effusions with superimposed infection not excluded. 2D echo pending.

## 2019-12-09 NOTE — NURSING
No change in condition. O2 sats continue to be between 76-89%. Vapo-therm setting increased to 90% d/t 65% sat  O2, sats now at 85%. Pt's drowsy but responds to voice. Upset that he has not had anything to eat. Cough continues. Will continue to monitor throughout shift.

## 2019-12-09 NOTE — ED NOTES
Pt taken off on non-rebreather and placed on 4L of oxygen via nasal cannula. Will monitor pt sats to see effects.

## 2019-12-09 NOTE — ASSESSMENT & PLAN NOTE
-In setting of afib with RVR  -Continue IV diuresis  -Strict I's/O's  -Continue BB  -Check 2D echo

## 2019-12-09 NOTE — ASSESSMENT & PLAN NOTE
-Presents with afib with RVR  -Improved since admission  -Continue Toprol XL  -Continue cardizem gtt for now given patient's respiratory distress this AM, difficulty swallowing pills  -Would recommend switching to po, when able  -Continue Pradaxa for AC

## 2019-12-09 NOTE — SUBJECTIVE & OBJECTIVE
"Past Medical History:   Diagnosis Date    Anticoagulant long-term use     Atrial fibrillation     COPD (chronic obstructive pulmonary disease)     History of cardioversion     Hypertension        History reviewed. No pertinent surgical history.    Review of patient's allergies indicates:   Allergen Reactions    Penicillins        No current facility-administered medications on file prior to encounter.      Current Outpatient Medications on File Prior to Encounter   Medication Sig    albuterol sulfate (PROAIR DIGIHALER) 90 mcg/actuation aebs Inhale into the lungs.    atorvastatin (LIPITOR) 40 MG tablet Take 40 mg by mouth once daily.    dabigatran etexilate (PRADAXA) 150 mg Cap Take 150 mg by mouth 2 (two) times daily. "Do NOT break, chew, or open capsules."     finasteride (PROSCAR) 5 mg tablet Take 5 mg by mouth once daily.    guaiFENesin (MUCINEX) 600 mg 12 hr tablet Take 600 mg by mouth once daily.    ipratropium-albuterol (COMBIVENT RESPIMAT)  mcg/actuation inhaler Inhale 1 puff into the lungs every 6 (six) hours as needed for Wheezing. Rescue    metoprolol succinate (TOPROL-XL) 100 MG 24 hr tablet Take 100 mg by mouth once daily.    mometasone (ASMANEX TWISTHALER) 220 mcg/ actuation (30) inhaler Inhale 2 puffs into the lungs once daily. Controller    multivitamin (THERAGRAN) per tablet Take 1 tablet by mouth once daily.    omeprazole (PRILOSEC) 20 MG capsule Take 20 mg by mouth once daily.    tamsulosin (FLOMAX) 0.4 mg Cap Take 0.4 mg by mouth once daily.    tiotropium (SPIRIVA) 18 mcg inhalation capsule Inhale 2.5 mcg into the lungs once daily. Controller     Family History     Problem Relation (Age of Onset)    No Known Problems Mother, Father, Sister, Brother        Tobacco Use    Smoking status: Former Smoker     Packs/day: 2.00    Smokeless tobacco: Never Used   Substance and Sexual Activity    Alcohol use: Not Currently    Drug use: Never    Sexual activity: Not Currently "     Review of Systems   Constitution: Positive for malaise/fatigue.   HENT: Negative.    Eyes: Negative.    Cardiovascular: Positive for dyspnea on exertion.   Respiratory: Positive for cough and shortness of breath.    Endocrine: Negative.    Hematologic/Lymphatic: Negative.    Skin: Negative.    Musculoskeletal: Negative.    Gastrointestinal: Negative.    Genitourinary: Negative.    Neurological: Negative.    Psychiatric/Behavioral: Negative.    Allergic/Immunologic: Negative.      Objective:     Vital Signs (Most Recent):  Temp: 97.6 °F (36.4 °C) (12/09/19 0739)  Pulse: 77 (12/09/19 1000)  Resp: 20 (12/09/19 0739)  BP: (!) 142/70 (12/09/19 0739)  SpO2: (!) 93 % (12/09/19 0739) Vital Signs (24h Range):  Temp:  [97.6 °F (36.4 °C)-99.1 °F (37.3 °C)] 97.6 °F (36.4 °C)  Pulse:  [] 77  Resp:  [20-36] 20  SpO2:  [69 %-100 %] 93 %  BP: (128-144)/(58-97) 142/70     Weight: 93.8 kg (206 lb 12.7 oz)  Body mass index is 27.28 kg/m².    SpO2: (!) 93 %  O2 Device (Oxygen Therapy): venti mask      Intake/Output Summary (Last 24 hours) at 12/9/2019 1036  Last data filed at 12/9/2019 0248  Gross per 24 hour   Intake 18.21 ml   Output 900 ml   Net -881.79 ml       Lines/Drains/Airways     Peripheral Intravenous Line                 Peripheral IV - Single Lumen 12/08/19 2356 18 G Right Forearm less than 1 day         Peripheral IV - Single Lumen 12/09/19 20 G Left Wrist less than 1 day                Physical Exam   Constitutional: He is oriented to person, place, and time. He appears well-developed and well-nourished. He appears distressed.   HENT:   Head: Normocephalic and atraumatic.   Eyes: Pupils are equal, round, and reactive to light. Right eye exhibits no discharge. Left eye exhibits no discharge.   Neck: Neck supple. No JVD present.   Cardiovascular: Normal rate, S1 normal, S2 normal and normal heart sounds. An irregularly irregular rhythm present.   No murmur heard.  Pulmonary/Chest: Effort normal. No respiratory  distress. He has rales.   Rhonchi, diminished BS at bases   Abdominal: Soft. He exhibits no distension. There is no tenderness.   Musculoskeletal: He exhibits edema (1-2+ BLE).   Neurological: He is alert and oriented to person, place, and time.   Skin: Skin is warm and dry. He is not diaphoretic. No erythema.   CVI changes BLE     Psychiatric: He has a normal mood and affect. His behavior is normal. Thought content normal.   Nursing note and vitals reviewed.      Significant Labs:   CMP   Recent Labs   Lab 12/08/19  2357      K 4.4   CL 97   CO2 36*   *   BUN 17   CREATININE 0.8   CALCIUM 9.1   PROT 7.5   ALBUMIN 3.3*   BILITOT 0.5   ALKPHOS 92   AST 19   ALT 23   ANIONGAP 9   ESTGFRAFRICA >60   EGFRNONAA >60   , CBC   Recent Labs   Lab 12/08/19  2357   WBC 13.50*   HGB 11.4*   HCT 39.6*      , Troponin   Recent Labs   Lab 12/08/19 2357   TROPONINI <0.006    and All pertinent lab results from the last 24 hours have been reviewed.    Significant Imaging: Echocardiogram: 2D echo with color flow doppler: No results found for this or any previous visit., EKG: Reviewed and X-Ray: CXR: X-Ray Chest 1 View (CXR): No results found for this visit on 12/08/19. and X-Ray Chest PA and Lateral (CXR): No results found for this visit on 12/08/19.

## 2019-12-09 NOTE — ED NOTES
Pt states that he is feeling better and does not feel as short of breath as he did when he came in. Vital signs are improving.

## 2019-12-09 NOTE — PT/OT/SLP PROGRESS
Speech Language Pathology      Nicolás Marin  MRN: [id number]    Patient not seen today secondary to his respiratory status.    ST initiated bedside eval via chart review and pt interview.  He is currently requiring high flow vapo-therm and continues to sat in the 80's with dips into 30s and 40s.  Nursing at bedside and pt is a 1:1.  ST will complete bedside swallow eval when pt's respiratory status improves.    Kaleigh Nelson CCC-SLP  9:30am

## 2019-12-09 NOTE — HPI
Mr Marin is a 79 year old male with PMHx of COPD on home O 2, HTN, A Fib on Pradaxa who presented to [facility] with complaints of progressive shortness of breath that has continue to worsen.  Denies associated symptoms chest pain, shortness of breath, fever, chills, nausea or vomiting. [facility] and is a poor historian. EKG in the Emergency Room showed A Fib with RVR rate in of 135, he was started of Cardizem drip in the Emergency Room. CT of chest showed bilateral plural effusions, Rt greater that left. . Troponin negative. He is a full code, granddaughter Katharine, is surrogate decision make. He is being admitted to telemety under the care of Hospital Medicine.

## 2019-12-09 NOTE — SIGNIFICANT EVENT
Blood gas ordered this am showed respiratory acidosis with a pH of 7.2 and pCO2: 110. Chronic CO2 retainer. Pt sitting up and talking. Will switch from nonrebreather to vapotherm. Plan to repeat blood gas in an hour and discuss case with ICU. May need to transfer to ICU for continuous avaps. Start azithromycin 500mg daily x3 for severe copd exacerbation. Breathing treatments q4h while awake. Solumedrol 80mg q8h. Echo pending. Patient is being diuresed. Procal normal.

## 2019-12-09 NOTE — PROGRESS NOTES
INCREASED  FIO2 . RN IS AWARE  OF SATS. NO RESP DISTRESS OBS, ENCOURAGED  PT TO  DO SOME PURSED LIP BREATHING.

## 2019-12-09 NOTE — SUBJECTIVE & OBJECTIVE
"Past Medical History:   Diagnosis Date    Anticoagulant long-term use     Atrial fibrillation     COPD (chronic obstructive pulmonary disease)     History of cardioversion     Hypertension        History reviewed. No pertinent surgical history.    Review of patient's allergies indicates:   Allergen Reactions    Penicillins        No current facility-administered medications on file prior to encounter.      Current Outpatient Medications on File Prior to Encounter   Medication Sig    albuterol sulfate (PROAIR DIGIHALER) 90 mcg/actuation aebs Inhale into the lungs.    atorvastatin (LIPITOR) 40 MG tablet Take 40 mg by mouth once daily.    dabigatran etexilate (PRADAXA) 150 mg Cap Take 150 mg by mouth 2 (two) times daily. "Do NOT break, chew, or open capsules."     finasteride (PROSCAR) 5 mg tablet Take 5 mg by mouth once daily.    guaiFENesin (MUCINEX) 600 mg 12 hr tablet Take 600 mg by mouth once daily.    ipratropium-albuterol (COMBIVENT RESPIMAT)  mcg/actuation inhaler Inhale 1 puff into the lungs every 6 (six) hours as needed for Wheezing. Rescue    metoprolol succinate (TOPROL-XL) 100 MG 24 hr tablet Take 100 mg by mouth once daily.    mometasone (ASMANEX TWISTHALER) 220 mcg/ actuation (30) inhaler Inhale 2 puffs into the lungs once daily. Controller    multivitamin (THERAGRAN) per tablet Take 1 tablet by mouth once daily.    omeprazole (PRILOSEC) 20 MG capsule Take 20 mg by mouth once daily.    tamsulosin (FLOMAX) 0.4 mg Cap Take 0.4 mg by mouth once daily.    tiotropium (SPIRIVA) 18 mcg inhalation capsule Inhale 2.5 mcg into the lungs once daily. Controller     Family History     Family history is unknown by patient.        Tobacco Use    Smoking status: Former Smoker     Packs/day: 2.00   Substance and Sexual Activity    Alcohol use: Not Currently    Drug use: Never    Sexual activity: Not on file     Review of Systems   Constitutional: Positive for fatigue. Negative for chills, " diaphoresis and fever.   HENT: Negative for congestion, ear discharge, rhinorrhea, sinus pressure, sore throat and trouble swallowing.    Eyes: Negative for discharge and visual disturbance.   Respiratory: Positive for cough and shortness of breath. Negative for apnea, choking, chest tightness, wheezing and stridor.    Cardiovascular: Negative for chest pain, palpitations and leg swelling.   Gastrointestinal: Negative for abdominal distention, abdominal pain, diarrhea, nausea and vomiting.   Endocrine: Negative for cold intolerance and heat intolerance.   Genitourinary: Negative for dysuria, frequency and hematuria.   Musculoskeletal: Negative for arthralgias, back pain, myalgias and neck pain.   Skin: Negative for pallor and rash.   Neurological: Positive for weakness. Negative for dizziness, seizures, syncope and headaches.   Psychiatric/Behavioral: Negative for agitation, confusion and sleep disturbance.     Objective:     Vital Signs (Most Recent):  Pulse: 93 (12/09/19 0217)  Resp: (!) 24 (12/09/19 0217)  BP: 128/73 (12/09/19 0217)  SpO2: (!) 90 % (12/09/19 0217) Vital Signs (24h Range):  Pulse:  [] 93  Resp:  [21-36] 24  SpO2:  [69 %-100 %] 90 %  BP: (128-139)/(58-97) 128/73        There is no height or weight on file to calculate BMI.    Physical Exam   Constitutional: No distress.   HENT:   Head: Normocephalic and atraumatic.   Eyes: Right eye exhibits no discharge. Left eye exhibits no discharge.   Neck: No JVD present.   Cardiovascular: An irregular rhythm present. Exam reveals no gallop and no friction rub.   No murmur heard.  Pulmonary/Chest: No stridor. No respiratory distress. He has decreased breath sounds in the right lower field and the left lower field. He has no wheezes. He has rhonchi. He has no rales. He exhibits no tenderness.   Abdominal: He exhibits no distension and no mass. There is no tenderness. There is no rebound and no guarding.   Musculoskeletal: He exhibits no edema or  deformity.   Neurological: He is alert.   Skin: He is not diaphoretic.   Nursing note and vitals reviewed.                Results for orders placed or performed during the hospital encounter of 12/08/19   CBC auto differential   Result Value Ref Range    WBC 13.50 (H) 3.90 - 12.70 K/uL    RBC 3.72 (L) 4.60 - 6.20 M/uL    Hemoglobin 11.4 (L) 14.0 - 18.0 g/dL    Hematocrit 39.6 (L) 40.0 - 54.0 %    Mean Corpuscular Volume 107 (H) 82 - 98 fL    Mean Corpuscular Hemoglobin 30.6 27.0 - 31.0 pg    Mean Corpuscular Hemoglobin Conc 28.8 (L) 32.0 - 36.0 g/dL    RDW 13.1 11.5 - 14.5 %    Platelets 174 150 - 350 K/uL    MPV 10.6 9.2 - 12.9 fL    Immature Granulocytes 0.5 0.0 - 0.5 %    Gran # (ANC) 11.7 (H) 1.8 - 7.7 K/uL    Immature Grans (Abs) 0.07 (H) 0.00 - 0.04 K/uL    Lymph # 0.7 (L) 1.0 - 4.8 K/uL    Mono # 1.0 0.3 - 1.0 K/uL    Eos # 0.0 0.0 - 0.5 K/uL    Baso # 0.03 0.00 - 0.20 K/uL    nRBC 0 0 /100 WBC    Gran% 86.6 (H) 38.0 - 73.0 %    Lymph% 5.3 (L) 18.0 - 48.0 %    Mono% 7.3 4.0 - 15.0 %    Eosinophil% 0.1 0.0 - 8.0 %    Basophil% 0.2 0.0 - 1.9 %    Differential Method Automated    Comprehensive metabolic panel   Result Value Ref Range    Sodium 142 136 - 145 mmol/L    Potassium 4.4 3.5 - 5.1 mmol/L    Chloride 97 95 - 110 mmol/L    CO2 36 (H) 23 - 29 mmol/L    Glucose 136 (H) 70 - 110 mg/dL    BUN, Bld 17 8 - 23 mg/dL    Creatinine 0.8 0.5 - 1.4 mg/dL    Calcium 9.1 8.7 - 10.5 mg/dL    Total Protein 7.5 6.0 - 8.4 g/dL    Albumin 3.3 (L) 3.5 - 5.2 g/dL    Total Bilirubin 0.5 0.1 - 1.0 mg/dL    Alkaline Phosphatase 92 55 - 135 U/L    AST 19 10 - 40 U/L    ALT 23 10 - 44 U/L    Anion Gap 9 8 - 16 mmol/L    eGFR if African American >60 >60 mL/min/1.73 m^2    eGFR if non African American >60 >60 mL/min/1.73 m^2   Troponin I #1   Result Value Ref Range    Troponin I <0.006 0.000 - 0.026 ng/mL   B-Type natriuretic peptide (BNP)   Result Value Ref Range     (H) 0 - 99 pg/mL   D dimer, quantitative   Result Value  Ref Range    D-Dimer 1.06 (H) <0.50 mg/L FEU   Lactic acid, plasma   Result Value Ref Range    Lactate (Lactic Acid) 0.7 0.5 - 2.2 mmol/L       Significant Imaging:    Imaging Results          CTA Chest Non-Coronary (PE Study) (In process)                X-Ray Chest AP Portable (Preliminary result)  Result time 12/09/19 00:34:12    ED Interpretation by Jackson Burns MD (12/09/19 00:34:12, [facility], Emergency Medicine)    Vascular Congestion

## 2019-12-09 NOTE — ASSESSMENT & PLAN NOTE
Admit to Telemetry   Pt on home O 2 at 2-3 L   Venti mask at 50 %, wean as tolerated  Nebs   Probable related to CHF and plural effusions   Lasix 40 mg IV BID

## 2019-12-09 NOTE — NURSING
AAOx4. Speech difficult to understand. On vapo-therm - 40 LPM, 50% O2, 33c,. Productive cough noted, however pt can not cough hard enough to get phlem out. Yanker on & in bed w/ pt for prn use when coughing. Assist x1 - standing. Pt is too unsteady to ambulate. In bed w/ HOB up 60 to assist w/ breathing. Calm & cooperative. All safety measures in place. This LPN to remain at bedside continuing to monitor throughout shift.

## 2019-12-09 NOTE — NURSING
Resting quietly in bed w/ eyes closed. O2 sat fluctuating between 78-95%. Assist x1 for transfers; able to sit in chair for approx 30m w/ NADN. Pt remains NPO; Dr. Joseph approved ice chips only at this time pending ST eval.  Grand daughter came to visit; pt was more alert & talkative. She requested to speak to Dr. Joseph regarding POC. He answered all questions at bedside. Urinating w/ out difficulty; 6 x's for total of 1150cc; clear yellow non-odorous. No BM during shift. Vapo-therm settings continue to be adjusted PRN pt's O2 sat; current setting - 40 L/min - 55% O2 - 33c. Breathing tx continue. Oral care performed; pericare done w/ bathing wipes. VSS. All safety measures remain in place. Will continue to monitor throughout shift.

## 2019-12-09 NOTE — ED NOTES
Pt sats still remaining in 80s once being taken off non-rebreather and placed on 4L of O2 via nasal cannula. Respiratory at bedside placing venti mask on pt.

## 2019-12-09 NOTE — ED NOTES
Pt presents to ED via EMS for shortness of breath. Pt states that he has been feeling short of breath for a few months. His family states that pt was sating in the 50's at home on 3L of oxygen. Pt was hypertensive upon EMS arrival. Pt was given Nitro spray and bp decreased. He was placed on a mask and sats increased to 96 by the time pt left the house. In route pt urinated on himself. He states that he has a history of afib. HR is currently elevated and going up to 160s.

## 2019-12-09 NOTE — PLAN OF CARE
12/09/19 1311   Discharge Assessment   Assessment Type Discharge Planning Assessment   Confirmed/corrected address and phone number on facesheet? Yes   Assessment information obtained from? Patient   Prior to hospitilization cognitive status: Alert/Oriented   Prior to hospitalization functional status: Independent;Assistive Equipment   Current cognitive status:   (Mild disorientation.)   Current Functional Status: Needs Assistance   Lives With alone   Able to Return to Prior Arrangements yes   Is patient able to care for self after discharge? No   Who are your caregiver(s) and their phone number(s)? Katharine Barnes (grand daughter) [phone]   Patient's perception of discharge disposition home health   Readmission Within the Last 30 Days no previous admission in last 30 days   Patient currently being followed by outpatient case management? No   Patient currently receives any other outside agency services? No   Equipment Currently Used at Home walker, rolling;oxygen   Do you have any problems affording any of your prescribed medications? No   Is the patient taking medications as prescribed? yes   Does the patient have transportation home? Yes   Transportation Anticipated agency;family or friend will provide   Does the patient receive services at the [facility]? No   Discharge Plan A Skilled Nursing Facility   Discharge Plan B Home Health   DME Needed Upon Discharge  ventilator   Patient/Family in Agreement with Plan yes     Met with pt at bedside for DC assessment. Pt was mildly disoriented but was able to fully participate in the interview. Pt reported that devi lives at home alone and uses Oxygen and a walker to ambulate. Pt has a grand daughter who lives nearby. [facility], but at this time it appears as though pt will need SNF placement and possibly a ventilator before DCing home. SWer provided a transitional care folder, information on advanced directives, information on  pharmacy bedside delivery, and discharge planning begins on admission with contact information for any needs/questions. Pt opted out of bedside medication delivery. [facility].    Gaurang Ulloa LMSW 12/9/2019 1:24 PM

## 2019-12-09 NOTE — ED NOTES
Pt sating in the 60s on 4L of oxygen. Pt still AAOx3. Pt placed on non-re breather to increase sats.

## 2019-12-09 NOTE — CONSULTS
[facility]  Cardiology  Consult Note    Patient Name: Nicolás Marin  MRN: [id number]  Admission Date: 12/8/2019  Hospital Length of Stay: 0 days  Code Status: No Order   Attending Provider: Sanchez Joseph MD   Consulting Provider: Jamila Lopez PA-C  Primary Care Physician: Yuli Lau MD  Principal Problem:Acute on chronic respiratory failure with hypoxia    Patient information was obtained from patient, past medical records and ER records.     Inpatient consult to Cardiology  Consult performed by: Jamila Lopez PA-C  Consult ordered by: Phi Santizo NP        Subjective:     Chief Complaint:  Shortness of breath    HPI:   Mr. Marin is an 79 year old male patient whose current medical conditions include COPD (on home O2), HTN, afib (on Pradaxa) who presented to [facility] ED yesterday evening with a chief complaint of worsening SOB over the past 1-2 weeks. Associated symptoms included non-productive cough and LE edema. Patient denied any associated harry chest pain, fever, chills, cough, near syncope, or syncope. Initial workup in ED revealed BNP of 221. EKG showed afib with RVR, rate 135 bpm and patient was subsequently placed on cardizem gtt and admitted for further evaluation and treatment. Cardiology consulted to assist with management. Patient seen and examined today, sitting up in bed. More SOB, placed on Vapotherm this AM. Denies chest pain. [facility]. Tries to be compliant with meds but does report difficulty swallowing pills. Chart reviewed. CT of chest showed bilateral pleural effusions with superimposed infection not excluded. 2D echo pending.     Past Medical History:   Diagnosis Date    Anticoagulant long-term use     Atrial fibrillation     COPD (chronic obstructive pulmonary disease)     History of cardioversion     Hypertension        History reviewed. No pertinent surgical history.    Review of patient's allergies indicates:   Allergen Reactions  "   Penicillins        No current facility-administered medications on file prior to encounter.      Current Outpatient Medications on File Prior to Encounter   Medication Sig    albuterol sulfate (PROAIR DIGIHALER) 90 mcg/actuation aebs Inhale into the lungs.    atorvastatin (LIPITOR) 40 MG tablet Take 40 mg by mouth once daily.    dabigatran etexilate (PRADAXA) 150 mg Cap Take 150 mg by mouth 2 (two) times daily. "Do NOT break, chew, or open capsules."     finasteride (PROSCAR) 5 mg tablet Take 5 mg by mouth once daily.    guaiFENesin (MUCINEX) 600 mg 12 hr tablet Take 600 mg by mouth once daily.    ipratropium-albuterol (COMBIVENT RESPIMAT)  mcg/actuation inhaler Inhale 1 puff into the lungs every 6 (six) hours as needed for Wheezing. Rescue    metoprolol succinate (TOPROL-XL) 100 MG 24 hr tablet Take 100 mg by mouth once daily.    mometasone (ASMANEX TWISTHALER) 220 mcg/ actuation (30) inhaler Inhale 2 puffs into the lungs once daily. Controller    multivitamin (THERAGRAN) per tablet Take 1 tablet by mouth once daily.    omeprazole (PRILOSEC) 20 MG capsule Take 20 mg by mouth once daily.    tamsulosin (FLOMAX) 0.4 mg Cap Take 0.4 mg by mouth once daily.    tiotropium (SPIRIVA) 18 mcg inhalation capsule Inhale 2.5 mcg into the lungs once daily. Controller     Family History     Problem Relation (Age of Onset)    No Known Problems Mother, Father, Sister, Brother        Tobacco Use    Smoking status: Former Smoker     Packs/day: 2.00    Smokeless tobacco: Never Used   Substance and Sexual Activity    Alcohol use: Not Currently    Drug use: Never    Sexual activity: Not Currently     Review of Systems   Constitution: Positive for malaise/fatigue.   HENT: Negative.    Eyes: Negative.    Cardiovascular: Positive for dyspnea on exertion.   Respiratory: Positive for cough and shortness of breath.    Endocrine: Negative.    Hematologic/Lymphatic: Negative.    Skin: Negative.    Musculoskeletal: " Negative.    Gastrointestinal: Negative.    Genitourinary: Negative.    Neurological: Negative.    Psychiatric/Behavioral: Negative.    Allergic/Immunologic: Negative.      Objective:     Vital Signs (Most Recent):  Temp: 97.6 °F (36.4 °C) (12/09/19 0739)  Pulse: 77 (12/09/19 1000)  Resp: 20 (12/09/19 0739)  BP: (!) 142/70 (12/09/19 0739)  SpO2: (!) 93 % (12/09/19 0739) Vital Signs (24h Range):  Temp:  [97.6 °F (36.4 °C)-99.1 °F (37.3 °C)] 97.6 °F (36.4 °C)  Pulse:  [] 77  Resp:  [20-36] 20  SpO2:  [69 %-100 %] 93 %  BP: (128-144)/(58-97) 142/70     Weight: 93.8 kg (206 lb 12.7 oz)  Body mass index is 27.28 kg/m².    SpO2: (!) 93 %  O2 Device (Oxygen Therapy): venti mask      Intake/Output Summary (Last 24 hours) at 12/9/2019 1036  Last data filed at 12/9/2019 0248  Gross per 24 hour   Intake 18.21 ml   Output 900 ml   Net -881.79 ml       Lines/Drains/Airways     Peripheral Intravenous Line                 Peripheral IV - Single Lumen 12/08/19 2356 18 G Right Forearm less than 1 day         Peripheral IV - Single Lumen 12/09/19 20 G Left Wrist less than 1 day                Physical Exam   Constitutional: He is oriented to person, place, and time. He appears well-developed and well-nourished. He appears distressed.   HENT:   Head: Normocephalic and atraumatic.   Eyes: Pupils are equal, round, and reactive to light. Right eye exhibits no discharge. Left eye exhibits no discharge.   Neck: Neck supple. No JVD present.   Cardiovascular: Normal rate, S1 normal, S2 normal and normal heart sounds. An irregularly irregular rhythm present.   No murmur heard.  Pulmonary/Chest: Effort normal. No respiratory distress. He has rales.   Rhonchi, diminished BS at bases   Abdominal: Soft. He exhibits no distension. There is no tenderness.   Musculoskeletal: He exhibits edema (1-2+ BLE).   Neurological: He is alert and oriented to person, place, and time.   Skin: Skin is warm and dry. He is not diaphoretic. No erythema.   CVI  changes BLE     Psychiatric: He has a normal mood and affect. His behavior is normal. Thought content normal.   Nursing note and vitals reviewed.      Significant Labs:   CMP   Recent Labs   Lab 12/08/19  2357      K 4.4   CL 97   CO2 36*   *   BUN 17   CREATININE 0.8   CALCIUM 9.1   PROT 7.5   ALBUMIN 3.3*   BILITOT 0.5   ALKPHOS 92   AST 19   ALT 23   ANIONGAP 9   ESTGFRAFRICA >60   EGFRNONAA >60   , CBC   Recent Labs   Lab 12/08/19  2357   WBC 13.50*   HGB 11.4*   HCT 39.6*      , Troponin   Recent Labs   Lab 12/08/19  2357   TROPONINI <0.006    and All pertinent lab results from the last 24 hours have been reviewed.    Significant Imaging: Echocardiogram: 2D echo with color flow doppler: No results found for this or any previous visit., EKG: Reviewed and X-Ray: CXR: X-Ray Chest 1 View (CXR): No results found for this visit on 12/08/19. and X-Ray Chest PA and Lateral (CXR): No results found for this visit on 12/08/19.    Assessment and Plan:   Patient who presents with SOB in setting of diastolic CHF, underlying COPD, and afib with RVR. HR improved since admission. Switch to oral cardizem when patient able to tolerate. Continue IV diuresis.. Consider steroids.     * Acute on chronic respiratory failure with hypoxia  -Secondary to diastolic CHF and underlying COPD  -Continue IV diuresis  -Continue nebs  -Consider steroids    Pleural effusion, bilateral  -Assess response to IV diuresis    HLD (hyperlipidemia)  -Continue statin    Essential hypertension  -Continue home meds    Acute on chronic congestive heart failure  -In setting of afib with RVR  -Continue IV diuresis  -Strict I's/O's  -Continue BB  -Check 2D echo    Atrial fibrillation with RVR  -Presents with afib with RVR  -Improved since admission  -Continue Toprol XL  -Continue cardizem gtt for now given patient's respiratory distress this AM, difficulty swallowing pills  -Would recommend switching to po, when able  -Continue Pradaxa for  AC        VTE Risk Mitigation (From admission, onward)         Ordered     dabigatran etexilate capsule 150 mg  2 times daily      12/09/19 6149                Thank you for your consult. I will follow-up with patient. Please contact us if you have any additional questions.    Jamila Lopez PA-C  Cardiology   [facility]

## 2019-12-10 LAB
ALBUMIN SERPL BCP-MCNC: 3.1 G/DL (ref 3.5–5.2)
ALLENS TEST: ABNORMAL
ALP SERPL-CCNC: 79 U/L (ref 55–135)
ALT SERPL W/O P-5'-P-CCNC: 16 U/L (ref 10–44)
ANION GAP SERPL CALC-SCNC: 6 MMOL/L (ref 8–16)
AST SERPL-CCNC: 11 U/L (ref 10–40)
BASO STIPL BLD QL SMEAR: ABNORMAL
BASOPHILS # BLD AUTO: 0 K/UL (ref 0–0.2)
BASOPHILS NFR BLD: 0 % (ref 0–1.9)
BILIRUB SERPL-MCNC: 0.6 MG/DL (ref 0.1–1)
BUN SERPL-MCNC: 25 MG/DL (ref 8–23)
CALCIUM SERPL-MCNC: 9 MG/DL (ref 8.7–10.5)
CHLORIDE SERPL-SCNC: 92 MMOL/L (ref 95–110)
CO2 SERPL-SCNC: 43 MMOL/L (ref 23–29)
CREAT SERPL-MCNC: 0.8 MG/DL (ref 0.5–1.4)
DELSYS: ABNORMAL
DIFFERENTIAL METHOD: ABNORMAL
EOSINOPHIL # BLD AUTO: 0 K/UL (ref 0–0.5)
EOSINOPHIL NFR BLD: 0 % (ref 0–8)
ERYTHROCYTE [DISTWIDTH] IN BLOOD BY AUTOMATED COUNT: 13.1 % (ref 11.5–14.5)
EST. GFR  (AFRICAN AMERICAN): >60 ML/MIN/1.73 M^2
EST. GFR  (NON AFRICAN AMERICAN): >60 ML/MIN/1.73 M^2
FIO2: 55
FLOW: 30
GLUCOSE SERPL-MCNC: 172 MG/DL (ref 70–110)
HCO3 UR-SCNC: 46.2 MMOL/L (ref 24–28)
HCT VFR BLD AUTO: 38.1 % (ref 40–54)
HGB BLD-MCNC: 10.8 G/DL (ref 14–18)
IMM GRANULOCYTES # BLD AUTO: 0.05 K/UL (ref 0–0.04)
IMM GRANULOCYTES NFR BLD AUTO: 0.6 % (ref 0–0.5)
LYMPHOCYTES # BLD AUTO: 0.6 K/UL (ref 1–4.8)
LYMPHOCYTES NFR BLD: 6.9 % (ref 18–48)
MCH RBC QN AUTO: 30.4 PG (ref 27–31)
MCHC RBC AUTO-ENTMCNC: 28.3 G/DL (ref 32–36)
MCV RBC AUTO: 107 FL (ref 82–98)
MODE: ABNORMAL
MONOCYTES # BLD AUTO: 0.3 K/UL (ref 0.3–1)
MONOCYTES NFR BLD: 2.9 % (ref 4–15)
NEUTROPHILS # BLD AUTO: 7.6 K/UL (ref 1.8–7.7)
NEUTROPHILS NFR BLD: 89.6 % (ref 38–73)
NRBC BLD-RTO: 0 /100 WBC
OVALOCYTES BLD QL SMEAR: ABNORMAL
PCO2 BLDA: 86.7 MMHG (ref 35–45)
PH SMN: 7.33 [PH] (ref 7.35–7.45)
PLATELET # BLD AUTO: 162 K/UL (ref 150–350)
PMV BLD AUTO: 10.5 FL (ref 9.2–12.9)
PO2 BLDA: 77 MMHG (ref 80–100)
POC BE: 20 MMOL/L
POC SATURATED O2: 93 % (ref 95–100)
POIKILOCYTOSIS BLD QL SMEAR: SLIGHT
POTASSIUM SERPL-SCNC: 4.5 MMOL/L (ref 3.5–5.1)
PROT SERPL-MCNC: 7.4 G/DL (ref 6–8.4)
RBC # BLD AUTO: 3.55 M/UL (ref 4.6–6.2)
SAMPLE: ABNORMAL
SITE: ABNORMAL
SODIUM SERPL-SCNC: 141 MMOL/L (ref 136–145)
STOMATOCYTES BLD QL SMEAR: PRESENT
WBC # BLD AUTO: 8.53 K/UL (ref 3.9–12.7)

## 2019-12-10 PROCEDURE — 63600175 PHARM REV CODE 636 W HCPCS: Performed by: FAMILY MEDICINE

## 2019-12-10 PROCEDURE — 94761 N-INVAS EAR/PLS OXIMETRY MLT: CPT

## 2019-12-10 PROCEDURE — 97165 OT EVAL LOW COMPLEX 30 MIN: CPT

## 2019-12-10 PROCEDURE — 99900035 HC TECH TIME PER 15 MIN (STAT)

## 2019-12-10 PROCEDURE — 97530 THERAPEUTIC ACTIVITIES: CPT

## 2019-12-10 PROCEDURE — 92610 EVALUATE SWALLOWING FUNCTION: CPT

## 2019-12-10 PROCEDURE — 99232 PR SUBSEQUENT HOSPITAL CARE,LEVL II: ICD-10-PCS | Mod: ,,, | Performed by: INTERNAL MEDICINE

## 2019-12-10 PROCEDURE — 36415 COLL VENOUS BLD VENIPUNCTURE: CPT

## 2019-12-10 PROCEDURE — 94640 AIRWAY INHALATION TREATMENT: CPT

## 2019-12-10 PROCEDURE — 80053 COMPREHEN METABOLIC PANEL: CPT

## 2019-12-10 PROCEDURE — 27100171 HC OXYGEN HIGH FLOW UP TO 24 HOURS

## 2019-12-10 PROCEDURE — 25000242 PHARM REV CODE 250 ALT 637 W/ HCPCS: Performed by: NURSE PRACTITIONER

## 2019-12-10 PROCEDURE — 25000003 PHARM REV CODE 250: Performed by: NURSE PRACTITIONER

## 2019-12-10 PROCEDURE — 99232 SBSQ HOSP IP/OBS MODERATE 35: CPT | Mod: ,,, | Performed by: INTERNAL MEDICINE

## 2019-12-10 PROCEDURE — 97163 PT EVAL HIGH COMPLEX 45 MIN: CPT

## 2019-12-10 PROCEDURE — 21400001 HC TELEMETRY ROOM

## 2019-12-10 PROCEDURE — 25000003 PHARM REV CODE 250: Performed by: EMERGENCY MEDICINE

## 2019-12-10 PROCEDURE — 82803 BLOOD GASES ANY COMBINATION: CPT

## 2019-12-10 PROCEDURE — 25000242 PHARM REV CODE 250 ALT 637 W/ HCPCS: Performed by: FAMILY MEDICINE

## 2019-12-10 PROCEDURE — 36600 WITHDRAWAL OF ARTERIAL BLOOD: CPT

## 2019-12-10 PROCEDURE — 85025 COMPLETE CBC W/AUTO DIFF WBC: CPT

## 2019-12-10 PROCEDURE — 25000003 PHARM REV CODE 250: Performed by: PHYSICIAN ASSISTANT

## 2019-12-10 PROCEDURE — 63600175 PHARM REV CODE 636 W HCPCS: Performed by: NURSE PRACTITIONER

## 2019-12-10 RX ORDER — PREDNISONE 20 MG/1
20 TABLET ORAL DAILY
Status: COMPLETED | OUTPATIENT
Start: 2019-12-11 | End: 2019-12-15

## 2019-12-10 RX ORDER — DILTIAZEM HYDROCHLORIDE 180 MG/1
180 CAPSULE, COATED, EXTENDED RELEASE ORAL DAILY
Status: DISCONTINUED | OUTPATIENT
Start: 2019-12-10 | End: 2019-12-17 | Stop reason: HOSPADM

## 2019-12-10 RX ADMIN — IPRATROPIUM BROMIDE AND ALBUTEROL SULFATE 3 ML: .5; 3 SOLUTION RESPIRATORY (INHALATION) at 07:12

## 2019-12-10 RX ADMIN — THERA TABS 1 TABLET: TAB at 08:12

## 2019-12-10 RX ADMIN — Medication 12.5 MG/HR: at 05:12

## 2019-12-10 RX ADMIN — DABIGATRAN ETEXILATE MESYLATE 150 MG: 150 CAPSULE ORAL at 09:12

## 2019-12-10 RX ADMIN — DILTIAZEM HYDROCHLORIDE 180 MG: 180 CAPSULE, COATED, EXTENDED RELEASE ORAL at 11:12

## 2019-12-10 RX ADMIN — METHYLPREDNISOLONE SODIUM SUCCINATE 80 MG: 40 INJECTION, POWDER, FOR SOLUTION INTRAMUSCULAR; INTRAVENOUS at 01:12

## 2019-12-10 RX ADMIN — DABIGATRAN ETEXILATE MESYLATE 150 MG: 150 CAPSULE ORAL at 08:12

## 2019-12-10 RX ADMIN — PANTOPRAZOLE SODIUM 40 MG: 40 TABLET, DELAYED RELEASE ORAL at 08:12

## 2019-12-10 RX ADMIN — ATORVASTATIN CALCIUM 40 MG: 40 TABLET, FILM COATED ORAL at 08:12

## 2019-12-10 RX ADMIN — TAMSULOSIN HYDROCHLORIDE 0.4 MG: 0.4 CAPSULE ORAL at 08:12

## 2019-12-10 RX ADMIN — METOPROLOL SUCCINATE 100 MG: 50 TABLET, FILM COATED, EXTENDED RELEASE ORAL at 08:12

## 2019-12-10 RX ADMIN — AZITHROMYCIN MONOHYDRATE 500 MG: 500 INJECTION, POWDER, LYOPHILIZED, FOR SOLUTION INTRAVENOUS at 11:12

## 2019-12-10 RX ADMIN — IPRATROPIUM BROMIDE AND ALBUTEROL SULFATE 3 ML: .5; 3 SOLUTION RESPIRATORY (INHALATION) at 08:12

## 2019-12-10 RX ADMIN — BUDESONIDE 0.5 MG: 0.5 INHALANT RESPIRATORY (INHALATION) at 07:12

## 2019-12-10 RX ADMIN — FUROSEMIDE 40 MG: 10 INJECTION, SOLUTION INTRAMUSCULAR; INTRAVENOUS at 05:12

## 2019-12-10 RX ADMIN — FINASTERIDE 5 MG: 5 TABLET, FILM COATED ORAL at 08:12

## 2019-12-10 RX ADMIN — FUROSEMIDE 40 MG: 10 INJECTION, SOLUTION INTRAMUSCULAR; INTRAVENOUS at 08:12

## 2019-12-10 RX ADMIN — IPRATROPIUM BROMIDE AND ALBUTEROL SULFATE 3 ML: .5; 3 SOLUTION RESPIRATORY (INHALATION) at 01:12

## 2019-12-10 RX ADMIN — BUDESONIDE 0.5 MG: 0.5 INHALANT RESPIRATORY (INHALATION) at 08:12

## 2019-12-10 RX ADMIN — IPRATROPIUM BROMIDE AND ALBUTEROL SULFATE 3 ML: .5; 3 SOLUTION RESPIRATORY (INHALATION) at 04:12

## 2019-12-10 RX ADMIN — METHYLPREDNISOLONE SODIUM SUCCINATE 80 MG: 40 INJECTION, POWDER, FOR SOLUTION INTRAMUSCULAR; INTRAVENOUS at 05:12

## 2019-12-10 NOTE — SUBJECTIVE & OBJECTIVE
Interval History: No acute events reported overnight. Tolerating diet     Review of Systems   Constitutional: Negative for fatigue and fever.   Respiratory: Positive for shortness of breath (mild ).    Cardiovascular: Negative for chest pain.   Gastrointestinal: Negative for nausea and vomiting.   Skin: Negative for rash.     Objective:     Vital Signs (Most Recent):  Temp: 98.1 °F (36.7 °C) (12/10/19 1110)  Pulse: 78 (12/10/19 1320)  Resp: 20 (12/10/19 1320)  BP: (!) 142/61 (12/10/19 1110)  SpO2: 96 % (12/10/19 1320) Vital Signs (24h Range):  Temp:  [96.7 °F (35.9 °C)-98.1 °F (36.7 °C)] 98.1 °F (36.7 °C)  Pulse:  [63-91] 78  Resp:  [16-20] 20  SpO2:  [90 %-98 %] 96 %  BP: (118-142)/(58-76) 142/61     Weight: 96.4 kg (212 lb 8.4 oz)  Body mass index is 28.04 kg/m².    Intake/Output Summary (Last 24 hours) at 12/10/2019 1521  Last data filed at 12/10/2019 0540  Gross per 24 hour   Intake 582.91 ml   Output 1550 ml   Net -967.09 ml      Physical Exam   Constitutional: He appears well-developed and well-nourished. No distress.   Cardiovascular: Normal rate, regular rhythm and normal heart sounds. Exam reveals no gallop and no friction rub.   No murmur heard.  Pulmonary/Chest: Effort normal and breath sounds normal. No stridor. No respiratory distress. He has no wheezes (inspiratory and expiratory ). He has no rales.   Abdominal: Soft. Bowel sounds are normal. He exhibits no distension and no mass. There is no tenderness. There is no guarding.   Musculoskeletal: He exhibits no edema.   Skin: Skin is warm. No rash noted. He is not diaphoretic.       Significant Labs:   Recent Lab Results       12/10/19  0818   12/10/19  0620        Albumin   3.1     Alkaline Phosphatase   79     Allens Test Pass       ALT   16     Anion Gap   6     AST   11     Baso #   0.00     Basophilic Stippling   Occasional     Basophil%   0.0     BILIRUBIN TOTAL   0.6  Comment:  For infants and newborns, interpretation of results should be  based  on gestational age, weight and in agreement with clinical  observations.  Premature Infant recommended reference ranges:  Up to 24 hours.............<8.0 mg/dL  Up to 48 hours............<12.0 mg/dL  3-5 days..................<15.0 mg/dL  6-29 days.................<15.0 mg/dL       Site LR       BUN, Bld   25     Calcium   9.0     Chloride   92     CO2   43  Comment:  CO2  critical result(s) called and verbal readback obtained from   JOSÉ JAMISON RN by CHARITY 12/10/2019 07:24       Creatinine   0.8     DelSys Nasal Can       Differential Method   Automated     eGFR if    >60     eGFR if non    >60  Comment:  Calculation used to obtain the estimated glomerular filtration  rate (eGFR) is the CKD-EPI equation.        Eos #   0.0     Eosinophil%   0.0     FiO2 55       Flow 30       Glucose   172     Gran # (ANC)   7.6     Gran%   89.6     Hematocrit   38.1     Hemoglobin   10.8     Immature Grans (Abs)   0.05  Comment:  Mild elevation in immature granulocytes is non specific and   can be seen in a variety of conditions including stress response,   acute inflammation, trauma and pregnancy. Correlation with other   laboratory and clinical findings is essential.       Immature Granulocytes   0.6     Lymph #   0.6     Lymph%   6.9     MCH   30.4     MCHC   28.3     MCV   107     Mode SPONT       Mono #   0.3     Mono%   2.9     MPV   10.5     nRBC   0     Ovalocytes   Occasional     Platelets   162     POC BE 20       POC HCO3 46.2       POC PCO2 86.7       POC PH 7.334       POC PO2 77       POC SATURATED O2 93       Poik   Slight     Potassium   4.5     PROTEIN TOTAL   7.4     RBC   3.55     RDW   13.1     Sample ARTERIAL       Sodium   141     Stomatocytes   Present     WBC   8.53         All pertinent labs within the past 24 hours have been reviewed.    Significant Imaging: I have reviewed all pertinent imaging results/findings within the past 24 hours.

## 2019-12-10 NOTE — ASSESSMENT & PLAN NOTE
Admitted with A Fib with RVR   Cardizem drip   Continue B blocker   Cardiology Consult     12/10:  cardizem drip dc and transitioned to PO cardizem  Cont to monitor HR   pradaxa bid

## 2019-12-10 NOTE — PROGRESS NOTES
[facility]  Cardiology  Progress Note    Patient Name: Nicolás Marin  MRN: [id number]  Admission Date: 12/8/2019  Hospital Length of Stay: 1 days  Code Status: No Order   Attending Physician: Sanchez Joseph MD   Primary Care Physician: Yuli Lau MD  Expected Discharge Date:   Principal Problem:Acute on chronic respiratory failure with hypoxia    Subjective:   HPI:  Mr. Marin is an 79 year old male patient whose current medical conditions include COPD (on home O2), HTN, afib (on Pradaxa) who presented to [facility] ED yesterday evening with a chief complaint of worsening SOB over the past 1-2 weeks. Associated symptoms included non-productive cough and LE edema. Patient denied any associated harry chest pain, fever, chills, cough, near syncope, or syncope. Initial workup in ED revealed BNP of 221. EKG showed afib with RVR, rate 135 bpm and patient was subsequently placed on cardizem gtt and admitted for further evaluation and treatment. Cardiology consulted to assist with management. Patient seen and examined today, sitting up in bed. More SOB, placed on Vapotherm this AM. Denies chest pain. [facility] Tries to be compliant with meds but does report difficulty swallowing pills. Chart reviewed. CT of chest showed bilateral pleural effusions with superimposed infection not excluded. 2D echo pending.     Hospital Course:   12/10/19-Patient seen and examined today. Seems to be feeling much better. SOB improved. No harry chest pain/heaviness. Remains in afib, HR controlled. Labs reviewed. 2D echo showed normal EF, mild-moderate AI, mild AS.        Review of Systems   Constitution: Negative.   HENT: Negative.    Eyes: Negative.    Cardiovascular: Positive for dyspnea on exertion.   Respiratory: Positive for cough and shortness of breath.    Endocrine: Negative.    Hematologic/Lymphatic: Bruises/bleeds easily.   Skin: Negative.    Musculoskeletal: Positive for arthritis and joint pain.    Gastrointestinal: Negative.    Genitourinary: Negative.    Neurological: Negative.    Psychiatric/Behavioral: Negative.    Allergic/Immunologic: Negative.      Objective:     Vital Signs (Most Recent):  Temp: 96.7 °F (35.9 °C) (12/10/19 0722)  Pulse: 64 (12/10/19 0905)  Resp: 20 (12/10/19 0722)  BP: 125/76 (12/10/19 0722)  SpO2: (!) 92 % (12/10/19 0722) Vital Signs (24h Range):  Temp:  [96.7 °F (35.9 °C)-97.5 °F (36.4 °C)] 96.7 °F (35.9 °C)  Pulse:  [63-91] 64  Resp:  [16-20] 20  SpO2:  [85 %-98 %] 92 %  BP: (109-125)/(58-76) 125/76     Weight: 96.4 kg (212 lb 8.4 oz)  Body mass index is 28.04 kg/m².     SpO2: (!) 92 %  O2 Device (Oxygen Therapy): Vapotherm      Intake/Output Summary (Last 24 hours) at 12/10/2019 1057  Last data filed at 12/10/2019 0540  Gross per 24 hour   Intake 832.91 ml   Output 1550 ml   Net -717.09 ml       Lines/Drains/Airways     Peripheral Intravenous Line                 Peripheral IV - Single Lumen 12/08/19 2356 18 G Right Forearm 1 day         Peripheral IV - Single Lumen 12/09/19 20 G Left Wrist 1 day                Physical Exam   Constitutional: He is oriented to person, place, and time. He appears well-developed and well-nourished. No distress.   On supplemental O2   HENT:   Head: Normocephalic and atraumatic.   Eyes: Pupils are equal, round, and reactive to light. Right eye exhibits no discharge. Left eye exhibits no discharge.   Neck: Neck supple. No JVD present.   Cardiovascular: Normal rate, S1 normal, S2 normal and normal heart sounds. An irregularly irregular rhythm present.   No murmur heard.  Pulmonary/Chest: Effort normal. No respiratory distress. He has no wheezes. He has rales.   Rhonchi/coarse BS throughout   Abdominal: Soft. He exhibits no distension.   Musculoskeletal: He exhibits no edema.   Neurological: He is alert and oriented to person, place, and time.   Skin: Skin is warm and dry. He is not diaphoretic. No erythema.   Psychiatric: He has a normal mood and  affect. His behavior is normal. Thought content normal.   Nursing note and vitals reviewed.      Significant Labs:   CMP   Recent Labs   Lab 12/08/19  2357 12/10/19  0620    141   K 4.4 4.5   CL 97 92*   CO2 36* 43*   * 172*   BUN 17 25*   CREATININE 0.8 0.8   CALCIUM 9.1 9.0   PROT 7.5 7.4   ALBUMIN 3.3* 3.1*   BILITOT 0.5 0.6   ALKPHOS 92 79   AST 19 11   ALT 23 16   ANIONGAP 9 6*   ESTGFRAFRICA >60 >60   EGFRNONAA >60 >60   , CBC   Recent Labs   Lab 12/08/19  2357 12/10/19  0620   WBC 13.50* 8.53   HGB 11.4* 10.8*   HCT 39.6* 38.1*    162   , Troponin   Recent Labs   Lab 12/08/19 2357   TROPONINI <0.006    and All pertinent lab results from the last 24 hours have been reviewed.    Significant Imaging: Echocardiogram:   2D echo with color flow doppler:   Results for orders placed or performed during the hospital encounter of 12/08/19   2D echo with color flow doppler   Result Value Ref Range    QEF 55 55 - 65    Mitral Valve Regurgitation MILD     Diastolic Dysfunction Yes (A)     Aortic Valve Regurgitation MILD TO MODERATE (A)     Aortic Valve Stenosis MILD (A)     Est. PA Systolic Pressure 57.25 (A)     Tricuspid Valve Regurgitation MILD TO MODERATE     Narrative    Date of Procedure: 12/09/2019        TEST DESCRIPTION   Technical Quality: This is a technically challenging study. There is poor endocardial definition.     General: The patient was bradycardic throughout the study.    Aorta: The aortic root is upper limit of normal, measuring 3.5 cm at sinotubular junction and 3.7 cm at Sinuses of Valsalva. The proximal ascending aorta is upper limit of normal, measuring 3.5 cm across.     Left Atrium: The left atrial volume index is severely enlarged, measuring 64.20 cc/m2.     Left Ventricle: The left ventricle is normal in size, with an end-diastolic diameter of 4.5 cm, and an end-systolic diameter of 3.1 cm. Wall thickness is increased, with the septum measuring 1.5 cm and the posterior  wall measuring 1.6 cm across. Relative   wall thickness was increased at 0.71, and the LV mass index was increased at 159.7 g/m2 consistent with concentric left ventricular hypertrophy. There are no regional wall motion abnormalities. Left ventricular systolic function appears normal. Visually   estimated ejection fraction is 55%. The LV Doppler derived stroke volume equals 95.0 ccs.     Diastolic indices: E wave velocity 1.4 m/s, E/A ratio 3.9,  msec., E/e' ratio(avg) 18. There is diastolic dysfunction secondary to restrictive abnormality.     Right Atrium: The right atrium is mildly enlarged, measuring 6.2 cm in length and 4.7 cm in width in the apical view.     Right Ventricle: The right ventricle is upper limit of normal measuring 3.5 cm at the base in the apical right ventricle-focused view. Global right ventricular systolic function appears low normal to mildly depressed. Tricuspid annular plane systolic   excursion (TAPSE) is 1.8 cm. The estimated PA systolic pressure is 57 mmHg.     Aortic Valve:  The aortic valve is markedly sclerotic with markedly restricted leaflet mobility. The peak velocity obtained across the aortic valve is 2.02 m/s, which translates to a peak gradient of 16 mmHg. The mean gradient is 9 mmHg. Using a left   ventricular outflow tract diameter of 2.0 cm, a left ventricular outflow tract velocity time integral of 29 cm, and a peak instantaneous transvalvular velocity time integral of 48 cm, the calculated aortic valve area is 1.97 cm2(AVAi is 0.9 cm2/m2),   consistent with mild aortic stenosis. Additionally, there is mild to moderate aortic regurgitation.     Mitral Valve:  The mitral valve is mildly sclerotic. The pressure half time is 80 msec. The calculated mitral valve area is 2.75 cm2. There is mild mitral regurgitation. There is mitral annular calcification.     Tricuspid Valve:  The tricuspid valve is normal in structure. There is mild to moderate tricuspid regurgitation.      Pulmonary Valve:  The pulmonic valve is not well seen.     IVC: IVC is enlarged and collapses < 50% with a sniff, suggesting high right atrial pressure of 15 mmHg.     Intracavitary: There is no evidence of pericardial effusion, intracavity mass, thrombi, or vegetation.         CONCLUSIONS     1 - Normal left ventricular systolic function (EF 55%).     2 - Restrictive LV filling pattern, indicating markedly elevated LAP (grade 3 diastolic dysfunction).     3 - Right ventricle is upper limit of normal in size with low normal to mildly depressed systolic function.     4 - Pulmonary hypertension. The estimated PA systolic pressure is 57 mmHg.     5 - Mild aortic stenosis, CRUZ = 1.97 cm2, AVAi = 0.9 cm2/m2, peak velocity = 2.02 m/s, mean gradient = 9 mmHg.     6 - Mild to moderate aortic regurgitation.     7 - Mild to moderate tricuspid regurgitation.     8 - Concentric hypertrophy.             This document has been electronically    SIGNED BY: Gonzalo Brito MD On: 12/09/2019 16:14   , EKG: Reviewed and X-Ray: CXR: X-Ray Chest 1 View (CXR): No results found for this visit on 12/08/19. and X-Ray Chest PA and Lateral (CXR): No results found for this visit on 12/08/19.    Assessment and Plan:   Patient who presents with SOB in setting of underlying COPD, decompensated diastolic CHF, and afib with RVR. Clinically improved. Continue diuresis. Switch cardizem to po. Will be available as needed.    * Acute on chronic respiratory failure with hypoxia  -Secondary to diastolic CHF and underlying COPD  -Continue IV diuresis  -Continue nebs  -Consider steroids, abx    Pleural effusion, bilateral  -Assess response to IV diuresis    HLD (hyperlipidemia)  -Continue statin    Essential hypertension  -Continue home meds    Acute on chronic congestive heart failure  -In setting of afib with RVR  -Continue IV diuresis  -Strict I's/O's  -Continue BB  -Check 2D echo    12/10/19  -Clinically improving  -2D echo showed normal  EF  -Continue IV diuresis  -Continue BB  -Consider addition of ACEi/ARB if BP permits      Atrial fibrillation with RVR  -Presents with afib with RVR  -Improved since admission  -Continue Toprol XL  -Continue cardizem gtt for now given patient's respiratory distress this AM, difficulty swallowing pills  -Would recommend switching to po, when able  -Continue Pradaxa for AC    12/10/19  -Remains in afib but HR controlled  -Continue Toprol XL  -D/C cardizem gtt; start cardizem  mg daily  -Continue Pradaxa for AC        VTE Risk Mitigation (From admission, onward)         Ordered     dabigatran etexilate capsule 150 mg  2 times daily      12/09/19 0349                Jamila Lopez PA-C  Cardiology  [facility]

## 2019-12-10 NOTE — ASSESSMENT & PLAN NOTE
Admit to Telemetry   Pt on home O 2 at 2-3 L   Venti mask at 50 %, wean as tolerated  Nebs   Probable related to CHF and plural effusions   Lasix 40 mg IV BID      12/10:  Breathing treatments  Transition from solumedrol to PO prednisone  Azithromycin   Wean O2 as tolerated

## 2019-12-10 NOTE — HOSPITAL COURSE
12/10/19-Patient seen and examined today. Seems to be feeling much better. SOB improved. No harry chest pain/heaviness. Remains in afib, HR controlled. Labs reviewed. 2D echo showed normal EF, mild-moderate AI, mild AS.

## 2019-12-10 NOTE — PT/OT/SLP EVAL
Occupational Therapy   Evaluation    Name: Nicolás Marin  MRN: [id number]  Admitting Diagnosis:  Acute on chronic respiratory failure with hypoxia      Recommendations:     Discharge Recommendations: home health OT(for safety assessment)  Discharge Equipment Recommendations:     Barriers to discharge:  Decreased caregiver support, None    Assessment:     Nicolás Marin is a 79 y.o. male with a medical diagnosis of Acute on chronic respiratory failure with hypoxia.  He presents with debility and generalized weakness. Performance deficits affecting function: weakness, impaired balance, impaired self care skills, decreased coordination, decreased safety awareness, decreased ROM, impaired endurance, impaired functional mobilty, decreased upper extremity function, gait instability.      Rehab Prognosis: Good; patient would benefit from acute skilled OT services to address these deficits and reach maximum level of function.       Plan:     Patient to be seen 3 x/week to address the above listed problems via self-care/home management, therapeutic activities, therapeutic exercises  · Plan of Care Expires: 12/17/19  · Plan of Care Reviewed with: patient    Subjective     Chief Complaint: debility and gener  Patient/Family Comments/goals:    Occupational Profile:  Living Environment: lives with spouse in 1 story house with   Previous level of function:   Roles and Routines: occupational therapy  Equipment Used at Home:  walker, rolling  Assistance upon Discharge:     Pain/Comfort:  · Pain Rating 1: 0/10    Patients cultural, spiritual, Judaism conflicts given the current situation:      Objective:     Communicated with:  Nurse and epic chart review prior to session.  Patient found up in chair with peripheral IV, oxygen, telemetry upon OT entry to room.    General Precautions: Standard, fall   Orthopedic Precautions:N/A   Braces: N/A     Occupational Performance:    Functional Mobility/Transfers:  · Patient completed Sit <> Stand  Transfer with minimum assistance  with  hand-held assist   · Patient completed Bed <> Chair Transfer using Step Transfer technique with minimum assistance with hand-held assist    Activities of Daily Living:  · Upper Body Dressing: minimum assistance .  · Lower Body Dressing: stand by assistance tony/ doff sock seated eob    Cognitive/Visual Perceptual:  Cognitive/Psychosocial Skills:     -       Oriented to: Person, Place, Time and Situation   -       Follows Commands/attention:Follows two-step commands  -       Communication: clear/fluent  -       Memory: No Deficits noted  -       Safety awareness/insight to disability: impaired   Visual/Perceptual:      -Intact .    Physical Exam:  Upper Extremity Range of Motion:     -       Right Upper Extremity: approx 120 degrees  -       Left Upper Extremity: WFL  Upper Extremity Strength:    -       Right Upper Extremity: mmt: 3/5 grossly  -       Left Upper Extremity: mmt: 3/5 grossly   Strength:    -       Right Upper Extremity: mmt: 3/5 grossly  -       Left Upper Extremity: mmt: 3/5 grossly    AMPAC 6 Click ADL:  AMPAC Total Score: 24    Treatment & Education:  Education:    Patient left up in chair with all lines intact, call button in reach, chair alarm on, nurse notified and 1: 1 cna present    GOALS:   Multidisciplinary Problems     Occupational Therapy Goals        Problem: Occupational Therapy Goal    Goal Priority Disciplines Outcome Interventions   Occupational Therapy Goal     OT, PT/OT     Description:  ot goals to be met by 12-17-19  sba with ue dressing  sba with le dressing  Pt will tolerate 1 set x 15 reps b ue rom exercise  sba with bsc/ toilet  t/f's                    History:     Past Medical History:   Diagnosis Date    Anticoagulant long-term use     Atrial fibrillation     COPD (chronic obstructive pulmonary disease)     History of cardioversion     Hypertension        History reviewed. No pertinent surgical history.    Time Tracking:      OT Date of Treatment: 12/10/19  OT Start Time: 1145  OT Stop Time: 1208  OT Total Time (min): 23 min    Billable Minutes:Evaluation 10 minutes  Therapeutic Activity 13 minutes    Leeann Pat OT  12/10/2019

## 2019-12-10 NOTE — PROGRESS NOTES
[facility] Hospital Medicine  Progress Note    Patient Name: Nicolás Marin  MRN: [id number]  Patient Class: IP- Inpatient   Admission Date: 12/8/2019  Length of Stay: 1 days  Attending Physician: Sanchez Joseph MD  Primary Care Provider: Yuli Lau MD        Subjective:     Principal Problem:Acute on chronic respiratory failure with hypoxia        HPI:  Mr Marin is a 79 year old male with PMHx of COPD on home O 2, HTN, A Fib on Pradaxa who presented to [facility] with complaints of progressive shortness of breath that has continue to worsen.  Denies associated symptoms chest pain, shortness of breath, fever, chills, nausea or vomiting. [facility] and is a poor historian. EKG in the Emergency Room showed A Fib with RVR rate in of 135, he was started of Cardizem drip in the Emergency Room. CT of chest showed bilateral plural effusions, Rt greater that left. . Troponin negative. He is a full code, granddaughter Katharine, is surrogate decision make. [facility] care of [facility].      Overview/Hospital Course:  12/10: stable on vapotherm, continue to wean as tolerated     Interval History: No acute events reported overnight. Tolerating diet     Review of Systems   Constitutional: Negative for fatigue and fever.   Respiratory: Positive for shortness of breath (mild ).    Cardiovascular: Negative for chest pain.   Gastrointestinal: Negative for nausea and vomiting.   Skin: Negative for rash.     Objective:     Vital Signs (Most Recent):  Temp: 98.1 °F (36.7 °C) (12/10/19 1110)  Pulse: 78 (12/10/19 1320)  Resp: 20 (12/10/19 1320)  BP: (!) 142/61 (12/10/19 1110)  SpO2: 96 % (12/10/19 1320) Vital Signs (24h Range):  Temp:  [96.7 °F (35.9 °C)-98.1 °F (36.7 °C)] 98.1 °F (36.7 °C)  Pulse:  [63-91] 78  Resp:  [16-20] 20  SpO2:  [90 %-98 %] 96 %  BP: (118-142)/(58-76) 142/61     Weight: 96.4 kg (212 lb 8.4 oz)  Body mass index is 28.04  kg/m².    Intake/Output Summary (Last 24 hours) at 12/10/2019 1521  Last data filed at 12/10/2019 0540  Gross per 24 hour   Intake 582.91 ml   Output 1550 ml   Net -967.09 ml      Physical Exam   Constitutional: He appears well-developed and well-nourished. No distress.   Cardiovascular: Normal rate, regular rhythm and normal heart sounds. Exam reveals no gallop and no friction rub.   No murmur heard.  Pulmonary/Chest: Effort normal and breath sounds normal. No stridor. No respiratory distress. He has no wheezes (inspiratory and expiratory ). He has no rales.   Abdominal: Soft. Bowel sounds are normal. He exhibits no distension and no mass. There is no tenderness. There is no guarding.   Musculoskeletal: He exhibits no edema.   Skin: Skin is warm. No rash noted. He is not diaphoretic.       Significant Labs:   Recent Lab Results       12/10/19  0818   12/10/19  0620        Albumin   3.1     Alkaline Phosphatase   79     Allens Test Pass       ALT   16     Anion Gap   6     AST   11     Baso #   0.00     Basophilic Stippling   Occasional     Basophil%   0.0     BILIRUBIN TOTAL   0.6  Comment:  For infants and newborns, interpretation of results should be based  on gestational age, weight and in agreement with clinical  observations.  Premature Infant recommended reference ranges:  Up to 24 hours.............<8.0 mg/dL  Up to 48 hours............<12.0 mg/dL  3-5 days..................<15.0 mg/dL  6-29 days.................<15.0 mg/dL       Site LR       BUN, Bld   25     Calcium   9.0     Chloride   92     CO2   43  Comment:  CO2  critical result(s) called and verbal readback obtained from   JOSÉ JAMISON RN by CHARITY 12/10/2019 07:24       Creatinine   0.8     DelSys Nasal Can       Differential Method   Automated     eGFR if    >60     eGFR if non    >60  Comment:  Calculation used to obtain the estimated glomerular filtration  rate (eGFR) is the CKD-EPI equation.        Eos #   0.0      Eosinophil%   0.0     FiO2 55       Flow 30       Glucose   172     Gran # (ANC)   7.6     Gran%   89.6     Hematocrit   38.1     Hemoglobin   10.8     Immature Grans (Abs)   0.05  Comment:  Mild elevation in immature granulocytes is non specific and   can be seen in a variety of conditions including stress response,   acute inflammation, trauma and pregnancy. Correlation with other   laboratory and clinical findings is essential.       Immature Granulocytes   0.6     Lymph #   0.6     Lymph%   6.9     MCH   30.4     MCHC   28.3     MCV   107     Mode SPONT       Mono #   0.3     Mono%   2.9     MPV   10.5     nRBC   0     Ovalocytes   Occasional     Platelets   162     POC BE 20       POC HCO3 46.2       POC PCO2 86.7       POC PH 7.334       POC PO2 77       POC SATURATED O2 93       Poik   Slight     Potassium   4.5     PROTEIN TOTAL   7.4     RBC   3.55     RDW   13.1     Sample ARTERIAL       Sodium   141     Stomatocytes   Present     WBC   8.53         All pertinent labs within the past 24 hours have been reviewed.    Significant Imaging: I have reviewed all pertinent imaging results/findings within the past 24 hours.      Assessment/Plan:      * Acute on chronic respiratory failure with hypoxia  Admit to Telemetry   Pt on home O 2 at 2-3 L   Venti mask at 50 %, wean as tolerated  Nebs   Probable related to CHF and plural effusions   Lasix 40 mg IV BID      12/10:  Breathing treatments  Transition from solumedrol to PO prednisone  Azithromycin   Wean O2 as tolerated     Pleural effusion, bilateral  CT shows bilateral plural effusion, more right than left   Lasix 40 mg IV BID       HLD (hyperlipidemia)  Continue Statin       Essential hypertension  Continue B blocker     12/10:  BP controlled     Acute on chronic congestive heart failure  Strict I & O   Daily weights  Fluid restriction   Low sodium diet   Check 2 D Echo     12/10:  Continue diuresis with lasix bid  Cont to monitor   Reviewed  echo          Atrial fibrillation with RVR  Admitted with A Fib with RVR   Cardizem drip   Continue B blocker   Cardiology Consult     12/10:  cardizem drip dc and transitioned to PO cardizem  Cont to monitor HR   pradaxa bid          VTE Risk Mitigation (From admission, onward)         Ordered     dabigatran etexilate capsule 150 mg  2 times daily      12/09/19 4177                      Sanchez Joseph MD  Department of Hospital Medicine   [facility]

## 2019-12-10 NOTE — ASSESSMENT & PLAN NOTE
-Presents with afib with RVR  -Improved since admission  -Continue Toprol XL  -Continue cardizem gtt for now given patient's respiratory distress this AM, difficulty swallowing pills  -Would recommend switching to po, when able  -Continue Pradaxa for AC    12/10/19  -Remains in afib but HR controlled  -Continue Toprol XL  -D/C cardizem gtt; start cardizem  mg daily  -Continue Pradaxa for AC

## 2019-12-10 NOTE — PLAN OF CARE
RUDI received a call back from [facility].  [facility] does not show that this admission has been [facility] to the [facility]. RUDI sent an email to registration to ensure that stay is called to the [facility].  [facility] will check to see if the [facility] facility can accept him.  If they can accept, [facility] financial forms will have to be completed.  CM awaiting a call back from [facility].

## 2019-12-10 NOTE — PROGRESS NOTES
Notified by RT ABG results - CO2 86.7, pH 7.33, O2 77, Bicarb 46.2, Sat 93% - notified Dr Joseph via secure chat.

## 2019-12-10 NOTE — PLAN OF CARE
Discussed Plan of Care with patient and verbalized understanding - Patient remains AAOx4 - remains free of falls, accidents and trauma during the day shift. Bed is in the low position and the call light is within reach. Diltiazem IV discontinued and PO started - PT/OT started seeing patient. Will continue to monitor

## 2019-12-10 NOTE — PLAN OF CARE
RUDI left a message for [facility] ([phone]) in regards to SNF placement.  Awaiting a call back from Carly

## 2019-12-10 NOTE — ASSESSMENT & PLAN NOTE
-In setting of afib with RVR  -Continue IV diuresis  -Strict I's/O's  -Continue BB  -Check 2D echo    12/10/19  -Clinically improving  -2D echo showed normal EF  -Continue IV diuresis  -Continue BB  -Consider addition of ACEi/ARB if BP permits

## 2019-12-10 NOTE — ASSESSMENT & PLAN NOTE
-Secondary to diastolic CHF and underlying COPD  -Continue IV diuresis  -Continue nebs  -Consider steroids, abx

## 2019-12-10 NOTE — SUBJECTIVE & OBJECTIVE
Review of Systems   Constitution: Negative.   HENT: Negative.    Eyes: Negative.    Cardiovascular: Positive for dyspnea on exertion.   Respiratory: Positive for cough and shortness of breath.    Endocrine: Negative.    Hematologic/Lymphatic: Bruises/bleeds easily.   Skin: Negative.    Musculoskeletal: Positive for arthritis and joint pain.   Gastrointestinal: Negative.    Genitourinary: Negative.    Neurological: Negative.    Psychiatric/Behavioral: Negative.    Allergic/Immunologic: Negative.      Objective:     Vital Signs (Most Recent):  Temp: 96.7 °F (35.9 °C) (12/10/19 0722)  Pulse: 64 (12/10/19 0905)  Resp: 20 (12/10/19 0722)  BP: 125/76 (12/10/19 0722)  SpO2: (!) 92 % (12/10/19 0722) Vital Signs (24h Range):  Temp:  [96.7 °F (35.9 °C)-97.5 °F (36.4 °C)] 96.7 °F (35.9 °C)  Pulse:  [63-91] 64  Resp:  [16-20] 20  SpO2:  [85 %-98 %] 92 %  BP: (109-125)/(58-76) 125/76     Weight: 96.4 kg (212 lb 8.4 oz)  Body mass index is 28.04 kg/m².     SpO2: (!) 92 %  O2 Device (Oxygen Therapy): Vapotherm      Intake/Output Summary (Last 24 hours) at 12/10/2019 1057  Last data filed at 12/10/2019 0540  Gross per 24 hour   Intake 832.91 ml   Output 1550 ml   Net -717.09 ml       Lines/Drains/Airways     Peripheral Intravenous Line                 Peripheral IV - Single Lumen 12/08/19 2356 18 G Right Forearm 1 day         Peripheral IV - Single Lumen 12/09/19 20 G Left Wrist 1 day                Physical Exam   Constitutional: He is oriented to person, place, and time. He appears well-developed and well-nourished. No distress.   On supplemental O2   HENT:   Head: Normocephalic and atraumatic.   Eyes: Pupils are equal, round, and reactive to light. Right eye exhibits no discharge. Left eye exhibits no discharge.   Neck: Neck supple. No JVD present.   Cardiovascular: Normal rate, S1 normal, S2 normal and normal heart sounds. An irregularly irregular rhythm present.   No murmur heard.  Pulmonary/Chest: Effort normal. No  respiratory distress. He has no wheezes. He has rales.   Rhonchi/coarse BS throughout   Abdominal: Soft. He exhibits no distension.   Musculoskeletal: He exhibits no edema.   Neurological: He is alert and oriented to person, place, and time.   Skin: Skin is warm and dry. He is not diaphoretic. No erythema.   Psychiatric: He has a normal mood and affect. His behavior is normal. Thought content normal.   Nursing note and vitals reviewed.      Significant Labs:   CMP   Recent Labs   Lab 12/08/19  2357 12/10/19  0620    141   K 4.4 4.5   CL 97 92*   CO2 36* 43*   * 172*   BUN 17 25*   CREATININE 0.8 0.8   CALCIUM 9.1 9.0   PROT 7.5 7.4   ALBUMIN 3.3* 3.1*   BILITOT 0.5 0.6   ALKPHOS 92 79   AST 19 11   ALT 23 16   ANIONGAP 9 6*   ESTGFRAFRICA >60 >60   EGFRNONAA >60 >60   , CBC   Recent Labs   Lab 12/08/19  2357 12/10/19  0620   WBC 13.50* 8.53   HGB 11.4* 10.8*   HCT 39.6* 38.1*    162   , Troponin   Recent Labs   Lab 12/08/19 2357   TROPONINI <0.006    and All pertinent lab results from the last 24 hours have been reviewed.    Significant Imaging: Echocardiogram:   2D echo with color flow doppler:   Results for orders placed or performed during the hospital encounter of 12/08/19   2D echo with color flow doppler   Result Value Ref Range    QEF 55 55 - 65    Mitral Valve Regurgitation MILD     Diastolic Dysfunction Yes (A)     Aortic Valve Regurgitation MILD TO MODERATE (A)     Aortic Valve Stenosis MILD (A)     Est. PA Systolic Pressure 57.25 (A)     Tricuspid Valve Regurgitation MILD TO MODERATE     Narrative    Date of Procedure: 12/09/2019        TEST DESCRIPTION   Technical Quality: This is a technically challenging study. There is poor endocardial definition.     General: The patient was bradycardic throughout the study.    Aorta: The aortic root is upper limit of normal, measuring 3.5 cm at sinotubular junction and 3.7 cm at Sinuses of Valsalva. The proximal ascending aorta is upper limit of  normal, measuring 3.5 cm across.     Left Atrium: The left atrial volume index is severely enlarged, measuring 64.20 cc/m2.     Left Ventricle: The left ventricle is normal in size, with an end-diastolic diameter of 4.5 cm, and an end-systolic diameter of 3.1 cm. Wall thickness is increased, with the septum measuring 1.5 cm and the posterior wall measuring 1.6 cm across. Relative   wall thickness was increased at 0.71, and the LV mass index was increased at 159.7 g/m2 consistent with concentric left ventricular hypertrophy. There are no regional wall motion abnormalities. Left ventricular systolic function appears normal. Visually   estimated ejection fraction is 55%. The LV Doppler derived stroke volume equals 95.0 ccs.     Diastolic indices: E wave velocity 1.4 m/s, E/A ratio 3.9,  msec., E/e' ratio(avg) 18. There is diastolic dysfunction secondary to restrictive abnormality.     Right Atrium: The right atrium is mildly enlarged, measuring 6.2 cm in length and 4.7 cm in width in the apical view.     Right Ventricle: The right ventricle is upper limit of normal measuring 3.5 cm at the base in the apical right ventricle-focused view. Global right ventricular systolic function appears low normal to mildly depressed. Tricuspid annular plane systolic   excursion (TAPSE) is 1.8 cm. The estimated PA systolic pressure is 57 mmHg.     Aortic Valve:  The aortic valve is markedly sclerotic with markedly restricted leaflet mobility. The peak velocity obtained across the aortic valve is 2.02 m/s, which translates to a peak gradient of 16 mmHg. The mean gradient is 9 mmHg. Using a left   ventricular outflow tract diameter of 2.0 cm, a left ventricular outflow tract velocity time integral of 29 cm, and a peak instantaneous transvalvular velocity time integral of 48 cm, the calculated aortic valve area is 1.97 cm2(AVAi is 0.9 cm2/m2),   consistent with mild aortic stenosis. Additionally, there is mild to moderate aortic  regurgitation.     Mitral Valve:  The mitral valve is mildly sclerotic. The pressure half time is 80 msec. The calculated mitral valve area is 2.75 cm2. There is mild mitral regurgitation. There is mitral annular calcification.     Tricuspid Valve:  The tricuspid valve is normal in structure. There is mild to moderate tricuspid regurgitation.     Pulmonary Valve:  The pulmonic valve is not well seen.     IVC: IVC is enlarged and collapses < 50% with a sniff, suggesting high right atrial pressure of 15 mmHg.     Intracavitary: There is no evidence of pericardial effusion, intracavity mass, thrombi, or vegetation.         CONCLUSIONS     1 - Normal left ventricular systolic function (EF 55%).     2 - Restrictive LV filling pattern, indicating markedly elevated LAP (grade 3 diastolic dysfunction).     3 - Right ventricle is upper limit of normal in size with low normal to mildly depressed systolic function.     4 - Pulmonary hypertension. The estimated PA systolic pressure is 57 mmHg.     5 - Mild aortic stenosis, CRUZ = 1.97 cm2, AVAi = 0.9 cm2/m2, peak velocity = 2.02 m/s, mean gradient = 9 mmHg.     6 - Mild to moderate aortic regurgitation.     7 - Mild to moderate tricuspid regurgitation.     8 - Concentric hypertrophy.             This document has been electronically    SIGNED BY: Gonzalo Brito MD On: 12/09/2019 16:14   , EKG: Reviewed and X-Ray: CXR: X-Ray Chest 1 View (CXR): No results found for this visit on 12/08/19. and X-Ray Chest PA and Lateral (CXR): No results found for this visit on 12/08/19.

## 2019-12-10 NOTE — HOSPITAL COURSE
Admitted for evaluation and treatment of acute respiratory failure.  Respiratory failure due to exacerbation of chronic congestive heart failure.  Probably due to atrial fibrillation with rapid ventricular rate.  Rate controlled with metoprolol and diltiazem.  Respiratory support initially with continuous BiPAP then weaned to vapotherm.  Subsequently weaned to nasal cannula.  Respiratory status return to baseline.  He has a chronic severe hypercapnia with compensatory metabolic alkalosis.  PCO2 is consistently about 80 with a serum bicarbonate of approximately 40.  Case management assisted with disposition.  After investigation of options for skilled placement through the [facility] there would be no beds available for several weeks.  Discharge plan to return home with home health.  Add diltiazem to daily medication regimen.  Follow-up with primary care provider in the next two weeks.

## 2019-12-10 NOTE — PLAN OF CARE
Patient AAOx4.  PIV clean dry and intact.  Diltiazem infusing @ 12.5 mL/hr  Bed alarm set, bed in lowest position, and side rails up x3.  No falls this shift.  Will continue to monitor.

## 2019-12-10 NOTE — ASSESSMENT & PLAN NOTE
Strict I & O   Daily weights  Fluid restriction   Low sodium diet   Check 2 D Echo     12/10:  Continue diuresis with lasix bid  Cont to monitor   Reviewed echo

## 2019-12-10 NOTE — PT/OT/SLP EVAL
Physical Therapy Evaluation    Patient Name:  Nicolás Marin   MRN:  [id number]    Recommendations:     Discharge Recommendations:  home health PT   Discharge Equipment Recommendations: none   Barriers to discharge: None    Assessment:     Nicolás Marin is a 79 y.o. male admitted with a medical diagnosis of Acute on chronic respiratory failure with hypoxia.  He presents with the following impairments/functional limitations:  weakness, gait instability, impaired balance, impaired endurance, decreased lower extremity function, impaired functional mobilty, decreased ROM .    Rehab Prognosis: Good; patient would benefit from acute skilled PT services to address these deficits and reach maximum level of function.    Recent Surgery: * No surgery found *      Plan:     During this hospitalization, patient to be seen   to address the identified rehab impairments via gait training, therapeutic activities, therapeutic exercises and progress toward the following goals:    · Plan of Care Expires:  12/17/19    Subjective     Chief Complaint: SOB  Patient/Family Comments/goals: INC MOBILITY   Pain/Comfort:  · Pain Rating 1: 0/10  · Pain Rating Post-Intervention 1: 0/10    Patients cultural, spiritual, Taoist conflicts given the current situation:      Living Environment:  PT LIVES AT HOME ALONE AND HAS A RAMP TO ENTER TRAILER  Prior to admission, patients level of function was  AMBULATES WITH SPC MOD I  AND DRIVES.  Equipment used at home: walker, rolling, oxygen, rollator.  DME owned (not currently used): none.  Upon discharge, patient will have assistance from UNKNOWN.    Objective:     Communicated with NURSE CECY AND Epic CHART REVIEW  prior to session.  Patient found SEATED EOB with peripheral IV, oxygen, telemetry  upon PT entry to room.    General Precautions: Standard, fall   Orthopedic Precautions:N/A   Braces: N/A     Exams:  · RLE ROM: WFL  · RLE Strength: WFL  · LLE ROM: WFL  · LLE Strength: WFL    Functional Mobility:  PT  MET IN RM SEATED EOB. PT ON VAPOTHERM AND ROM ASSESSED. PT HARD OF HEARING. PT STOOD WITH RW AND GT TRAINED X 2' WITH RW AND LIMITED D/T VAPOTHERM AND T/F TO CHAIR WITH RW AND CGA. PT LEFT SEATED AND EDUCATED ON ROLE OF P.T. PT LEFT SEATED WITH CHAIR ALARM ON AND ONE ON ONE PRESENT.     AM-PAC 6 CLICK MOBILITY  Total Score:18     Patient left up in chair with call button in reach and chair alarm on.    GOALS:   Multidisciplinary Problems     Physical Therapy Goals        Problem: Physical Therapy Goal    Goal Priority Disciplines Outcome Goal Variances Interventions   Physical Therapy Goal     PT, PT/OT      Description:  PT WILL BE SEEN FOR P.T. FOR A MIN OF 5 OUT OF 7 DAYS A WEEK  LT19  1. PT WILL COMPLETE BED MOBILITY MITCH  2. PT WILL T/F TO CHAIR WITH RW AND MITCH  3. PT WILL GT TRAIN X 15' WITH RW AND CGA  4. PT WILL COMPLETE B LE TE X 20 REPS                    History:     Past Medical History:   Diagnosis Date    Anticoagulant long-term use     Atrial fibrillation     COPD (chronic obstructive pulmonary disease)     History of cardioversion     Hypertension        History reviewed. No pertinent surgical history.    Time Tracking:     PT Received On: 12/10/19  PT Start Time: 0935     PT Stop Time: 1000  PT Total Time (min): 25 min     Billable Minutes: Evaluation 15 and Therapeutic Activity 10      Jen Leonard, PT  12/10/2019

## 2019-12-10 NOTE — PT/OT/SLP EVAL
Speech Language Pathology Evaluation  Bedside Swallow    Patient Name:  Nicolás Marin   MRN:  [id number]  Admitting Diagnosis: Acute on chronic respiratory failure with hypoxia    Recommendations:                 General Recommendations:  Follow-up not indicated  Diet recommendations:  Regular, Thin   Aspiration Precautions: 1 bite/sip at a time, HOB to 90 degrees, Remain upright 30 minutes post meal and Small bites/sips   General Precautions: Standard, fall      History:     Past Medical History:   Diagnosis Date    Anticoagulant long-term use     Atrial fibrillation     COPD (chronic obstructive pulmonary disease)     History of cardioversion     Hypertension        History reviewed. No pertinent surgical history.      Subjective   Pt seen at bedside with no family present.  1:1 sitter at bedside who reports pt has eaten breakfast and lunch without difficulty   Patient goals: none stated     Pain/Comfort:  · Pain Rating 1: (pt stated he had pain in his right shoulder from arthritis)  · Pain Addressed 2: Distraction, Reposition    Objective:     Oral Musculature Evaluation  ·  WFL    Bedside Swallow Eval:   Consistencies Assessed:  · Thin liquids and solids      Oral Phase:   · WFL    Pharyngeal Phase:   · no overt clinical signs/symptoms of aspiration  · no overt clinical signs/symptoms of pharyngeal dysphagia    Swallowing assessed at bedside with thin liquids and solids with no overt signs of swallow difficulty.      Assessment:     Nicolás Marin is a 79 y.o. male with an SLP diagnosis of a safe, functional swallow.  He no suspected aspiration and is tolerating regular consistencies without incidence.  No further ST warranted.        Plan:     · SLP Follow-Up:  No      Time Tracking:     SLP Treatment Date:   12/10/19  Speech Start Time:  1530  Speech Stop Time:  1547     Speech Total Time (min):  17 min    Billable Minutes: Eval Swallow and Oral Function 17    Kaleigh Nelson CCC-SLP  12/10/2019

## 2019-12-11 LAB
ANION GAP SERPL CALC-SCNC: 8 MMOL/L (ref 8–16)
ANISOCYTOSIS BLD QL SMEAR: SLIGHT
BASO STIPL BLD QL SMEAR: ABNORMAL
BASOPHILS # BLD AUTO: 0.01 K/UL (ref 0–0.2)
BASOPHILS NFR BLD: 0.1 % (ref 0–1.9)
BUN SERPL-MCNC: 30 MG/DL (ref 8–23)
CALCIUM SERPL-MCNC: 9.3 MG/DL (ref 8.7–10.5)
CHLORIDE SERPL-SCNC: 93 MMOL/L (ref 95–110)
CO2 SERPL-SCNC: 41 MMOL/L (ref 23–29)
CREAT SERPL-MCNC: 0.9 MG/DL (ref 0.5–1.4)
DIFFERENTIAL METHOD: ABNORMAL
EOSINOPHIL # BLD AUTO: 0 K/UL (ref 0–0.5)
EOSINOPHIL NFR BLD: 0 % (ref 0–8)
ERYTHROCYTE [DISTWIDTH] IN BLOOD BY AUTOMATED COUNT: 13.2 % (ref 11.5–14.5)
EST. GFR  (AFRICAN AMERICAN): >60 ML/MIN/1.73 M^2
EST. GFR  (NON AFRICAN AMERICAN): >60 ML/MIN/1.73 M^2
GLUCOSE SERPL-MCNC: 156 MG/DL (ref 70–110)
HCT VFR BLD AUTO: 38.2 % (ref 40–54)
HGB BLD-MCNC: 11.2 G/DL (ref 14–18)
IMM GRANULOCYTES # BLD AUTO: 0.07 K/UL (ref 0–0.04)
IMM GRANULOCYTES NFR BLD AUTO: 0.6 % (ref 0–0.5)
LYMPHOCYTES # BLD AUTO: 0.4 K/UL (ref 1–4.8)
LYMPHOCYTES NFR BLD: 3.1 % (ref 18–48)
MCH RBC QN AUTO: 31.1 PG (ref 27–31)
MCHC RBC AUTO-ENTMCNC: 29.3 G/DL (ref 32–36)
MCV RBC AUTO: 106 FL (ref 82–98)
MONOCYTES # BLD AUTO: 0.6 K/UL (ref 0.3–1)
MONOCYTES NFR BLD: 4.7 % (ref 4–15)
NEUTROPHILS # BLD AUTO: 11 K/UL (ref 1.8–7.7)
NEUTROPHILS NFR BLD: 91.5 % (ref 38–73)
NRBC BLD-RTO: 0 /100 WBC
PLATELET # BLD AUTO: 185 K/UL (ref 150–350)
PLATELET BLD QL SMEAR: ABNORMAL
PMV BLD AUTO: 11.1 FL (ref 9.2–12.9)
POLYCHROMASIA BLD QL SMEAR: ABNORMAL
POTASSIUM SERPL-SCNC: 4.5 MMOL/L (ref 3.5–5.1)
RBC # BLD AUTO: 3.6 M/UL (ref 4.6–6.2)
SODIUM SERPL-SCNC: 142 MMOL/L (ref 136–145)
WBC # BLD AUTO: 12.01 K/UL (ref 3.9–12.7)

## 2019-12-11 PROCEDURE — 27100092 HC HIGH FLOW DELIVERY CANNULA

## 2019-12-11 PROCEDURE — 94761 N-INVAS EAR/PLS OXIMETRY MLT: CPT

## 2019-12-11 PROCEDURE — 97116 GAIT TRAINING THERAPY: CPT

## 2019-12-11 PROCEDURE — 25000003 PHARM REV CODE 250: Performed by: NURSE PRACTITIONER

## 2019-12-11 PROCEDURE — 63600175 PHARM REV CODE 636 W HCPCS: Performed by: FAMILY MEDICINE

## 2019-12-11 PROCEDURE — 94640 AIRWAY INHALATION TREATMENT: CPT

## 2019-12-11 PROCEDURE — 21400001 HC TELEMETRY ROOM

## 2019-12-11 PROCEDURE — 63600175 PHARM REV CODE 636 W HCPCS: Performed by: NURSE PRACTITIONER

## 2019-12-11 PROCEDURE — 85025 COMPLETE CBC W/AUTO DIFF WBC: CPT

## 2019-12-11 PROCEDURE — 80048 BASIC METABOLIC PNL TOTAL CA: CPT

## 2019-12-11 PROCEDURE — 27100171 HC OXYGEN HIGH FLOW UP TO 24 HOURS

## 2019-12-11 PROCEDURE — 36415 COLL VENOUS BLD VENIPUNCTURE: CPT

## 2019-12-11 PROCEDURE — 97110 THERAPEUTIC EXERCISES: CPT

## 2019-12-11 PROCEDURE — 25000242 PHARM REV CODE 250 ALT 637 W/ HCPCS: Performed by: NURSE PRACTITIONER

## 2019-12-11 PROCEDURE — 25000242 PHARM REV CODE 250 ALT 637 W/ HCPCS: Performed by: FAMILY MEDICINE

## 2019-12-11 PROCEDURE — 25000003 PHARM REV CODE 250: Performed by: PHYSICIAN ASSISTANT

## 2019-12-11 RX ADMIN — DABIGATRAN ETEXILATE MESYLATE 150 MG: 150 CAPSULE ORAL at 08:12

## 2019-12-11 RX ADMIN — IPRATROPIUM BROMIDE AND ALBUTEROL SULFATE 3 ML: .5; 3 SOLUTION RESPIRATORY (INHALATION) at 04:12

## 2019-12-11 RX ADMIN — PANTOPRAZOLE SODIUM 40 MG: 40 TABLET, DELAYED RELEASE ORAL at 08:12

## 2019-12-11 RX ADMIN — IPRATROPIUM BROMIDE AND ALBUTEROL SULFATE 3 ML: .5; 3 SOLUTION RESPIRATORY (INHALATION) at 08:12

## 2019-12-11 RX ADMIN — AZITHROMYCIN MONOHYDRATE 500 MG: 500 INJECTION, POWDER, LYOPHILIZED, FOR SOLUTION INTRAVENOUS at 11:12

## 2019-12-11 RX ADMIN — BUDESONIDE 0.5 MG: 0.5 INHALANT RESPIRATORY (INHALATION) at 07:12

## 2019-12-11 RX ADMIN — ATORVASTATIN CALCIUM 40 MG: 40 TABLET, FILM COATED ORAL at 08:12

## 2019-12-11 RX ADMIN — FINASTERIDE 5 MG: 5 TABLET, FILM COATED ORAL at 08:12

## 2019-12-11 RX ADMIN — FUROSEMIDE 40 MG: 10 INJECTION, SOLUTION INTRAMUSCULAR; INTRAVENOUS at 08:12

## 2019-12-11 RX ADMIN — DILTIAZEM HYDROCHLORIDE 180 MG: 180 CAPSULE, COATED, EXTENDED RELEASE ORAL at 08:12

## 2019-12-11 RX ADMIN — IPRATROPIUM BROMIDE AND ALBUTEROL SULFATE 3 ML: .5; 3 SOLUTION RESPIRATORY (INHALATION) at 07:12

## 2019-12-11 RX ADMIN — METOPROLOL SUCCINATE 100 MG: 50 TABLET, FILM COATED, EXTENDED RELEASE ORAL at 08:12

## 2019-12-11 RX ADMIN — FUROSEMIDE 40 MG: 10 INJECTION, SOLUTION INTRAMUSCULAR; INTRAVENOUS at 05:12

## 2019-12-11 RX ADMIN — THERA TABS 1 TABLET: TAB at 08:12

## 2019-12-11 RX ADMIN — IPRATROPIUM BROMIDE AND ALBUTEROL SULFATE 3 ML: .5; 3 SOLUTION RESPIRATORY (INHALATION) at 11:12

## 2019-12-11 RX ADMIN — PREDNISONE 20 MG: 20 TABLET ORAL at 08:12

## 2019-12-11 RX ADMIN — TAMSULOSIN HYDROCHLORIDE 0.4 MG: 0.4 CAPSULE ORAL at 08:12

## 2019-12-11 RX ADMIN — BUDESONIDE 0.5 MG: 0.5 INHALANT RESPIRATORY (INHALATION) at 08:12

## 2019-12-11 NOTE — ASSESSMENT & PLAN NOTE
Admit to Telemetry   Pt on home O 2 at 2-3 L   Venti mask at 50 %, wean as tolerated  Nebs   Probable related to CHF and plural effusions   Lasix 40 mg IV BID      12/10:  Breathing treatments  Transition from solumedrol to PO prednisone  Azithromycin   Wean O2 as tolerated     12/11:  Cont breathing treatments and steroids  Azithromycin  Cont to wean fiO2 as tolerated

## 2019-12-11 NOTE — PLAN OF CARE
Pt AAO x 1.  VSS.  Pt remained afebrile throughout this shift.   IV ABX administered per order.   Pt remained free of falls this shift.   Pt c/o of no pain this shift.  Plan of care reviewed. Patient verbalizes understanding.   Pt moving/turing with assistance. Frequent weight shifting encouraged.  Patient AFib on monitor.   Bed low, side rails up x 2, wheels locked, call light in reach.   Bed alarm maintained for safety.   Patient instructed to call for assistance.   Hourly rounding completed.   24 hour chart check completed.  Will continue to monitor.

## 2019-12-11 NOTE — PHYSICIAN QUERY
PT Name: Nicolás Marin  MR #: [id number]    Physician Query Form - Atrial Fibrillation Specificity     CDS/: Shannon Elizabeth   RN CCDS          Contact information:[email or web address]     This form is a permanent document in the medical record.     Query Date: December 11, 2019    By submitting this query, we are merely seeking further clarification of documentation. Please utilize your independent clinical judgment when addressing the question(s) below.    The medical record contains the following:   Indicators     Supporting Clinical Findings Location in Medical Record   x Atrial Fibrillation EKG showed afib with RVR, rate 135 bpm and patient was subsequently placed on cardizem gtt and admitted for further evaluation and treatment    Hx of afib on pradaxa   Cardiology CN 12/10              H&P   x EKG results Afib with RVR  EKG 12/8   x Medication Cardizem gtt  Metoprolol PO  Cardizem PO  Pradaxa PO MAR    Treatment      Other         Provider, please further specify the Atrial Fibrillation diagnosis.    [  ] Chronic   [ X ] Paroxysmal   [  ] Permanent   [  ] Persistent   [  ] Other (please specify):   [  ] Clinically Undetermined       Please document in your progress notes daily for the duration of treatment until resolved, and include in your discharge summary.

## 2019-12-11 NOTE — PT/OT/SLP PROGRESS
Physical Therapy  Treatment    Nicolás Marin   MRN: [id number]   Admitting Diagnosis: Acute on chronic respiratory failure with hypoxia    PT Received On: 12/11/19  PT Start Time: 1415     PT Stop Time: 1440    PT Total Time (min): 25 min       Billable Minutes:  Gait Training 15 and Therapeutic Exercise 10       PT/PTA: PT     PTA Visit Number: 1       General Precautions: Standard, fall, respiratory(SITTER IN ROOM)  Orthopedic Precautions: N/A   Braces:           Subjective:  Communicated with Epic AND NURSE AYALA prior to session.  Pt was found sitting eob , talking to [facility] regarding going home today. ( sitter reports he has been calling all day, confused)   Pt agreed to tx.     Pain/Comfort  Pain Rating 1: 0/10    Objective:   Patient found with: peripheral IV, telemetry(VAPOTHERM)    Functional Mobility:  Bed Mobility:     not observed today    Transfers:    Sit to stand: MIN A   Stand to sit: min a    Gait:      4 feet fwd, 4 feet bwd with a RW and min a. Limited by the VAPO THERM. Sats 92% ( RT entered the room and checked them). Pt was unsteady due to his poor safety awareness, impulsive.         Therapeutic Activities and Exercises:  15 active  : ap, tke and HF , seated eob.     AM-PAC 6 CLICK MOBILITY  How much help from another person does this patient currently need?   1 = Unable, Total/Dependent Assistance  2 = A lot, Maximum/Moderate Assistance  3 = A little, Minimum/Contact Guard/Supervision  4 = None, Modified [place]/Independent    Turning over in bed (including adjusting bedclothes, sheets and blankets)?: 4  Sitting down on and standing up from a chair with arms (e.g., wheelchair, bedside commode, etc.): 3  Moving from lying on back to sitting on the side of the bed?: 4  Moving to and from a bed to a chair (including a wheelchair)?: 3  Need to walk in hospital room?: 3  Climbing 3-5 steps with a railing?: 1  Basic Mobility Total Score: 18    AM-PAC Raw Score CMS G-Code Modifier Level of  Impairment Assistance   6 % Total / Unable   7 - 9 CM 80 - 100% Maximal Assist   10 - 14 CL 60 - 80% Moderate Assist   15 - 19 CK 40 - 60% Moderate Assist   20 - 22 CJ 20 - 40% Minimal Assist   23 CI 1-20% SBA / CGA   24 CH 0% Independent/ Mod I     Patient left seated eob with sitter in room with all lines intact and call button in reach.    Assessment:  Nicolás Marin is a 79 y.o. male with a medical diagnosis of Acute on chronic respiratory failure with hypoxia and presents with impaired safety .  Limited by respiratory status.     Rehab identified problem list/impairments: Rehab identified problem list/impairments: weakness, impaired functional mobilty, decreased safety awareness, impaired cardiopulmonary response to activity, impaired endurance, gait instability, decreased coordination, impaired balance, impaired muscle length, decreased ROM, impaired self care skills    Rehab potential is fair.    Activity tolerance: Fair    Discharge recommendations:       Barriers to discharge:      Equipment recommendations: Equipment Needed After Discharge: none     GOALS:   Multidisciplinary Problems     Physical Therapy Goals        Problem: Physical Therapy Goal    Goal Priority Disciplines Outcome Goal Variances Interventions   Physical Therapy Goal     PT, PT/OT Ongoing, Progressing     Description:  PT WILL BE SEEN FOR P.T. FOR A MIN OF 5 OUT OF 7 DAYS A WEEK  LT19  1. PT WILL COMPLETE BED MOBILITY MITCH  2. PT WILL T/F TO CHAIR WITH RW AND MITCH  3. PT WILL GT TRAIN X 15' WITH RW AND CGA  4. PT WILL COMPLETE B LE TE X 20 REPS                    PLAN:    Patient to be seen 5 x/week  to address the above listed problems via gait training, therapeutic activities, therapeutic exercises  Plan of Care expires: 19  Plan of Care reviewed with: patient         Bozena Sullivan, PTA  2019

## 2019-12-11 NOTE — PLAN OF CARE
CM met with patient at the bedside to discuss the discharge plan.  Patient states that his granddaughter will not to be able to provide the needed care following discharge.  He states that he may need rehab prior to going home.  CM explained that [facility] will need [facility] and that he will have to complete a packet in order to expedite the SNF request.      CM left a message with HERSON Kirk at the [facility] to exped[facility] placement.  CM awaiting a call back from Carly.     12/11/19 6898   Discharge Reassessment   Assessment Type Discharge Planning Reassessment   Provided patient/caregiver education on the expected discharge date and the discharge plan Yes   Do you have any problems affording any of your prescribed medications? No   Discharge Plan A Skilled Nursing Facility   DME Needed Upon Discharge  none   Patient choice form signed by patient/caregiver N/A   Anticipated Discharge Disposition SNF   Can the patient answer the patient profile reliably? Yes, cognitively intact

## 2019-12-11 NOTE — ASSESSMENT & PLAN NOTE
Admitted with A Fib with RVR   Cardizem drip   Continue B blocker   Cardiology Consult     12/10:  cardizem drip dc and transitioned to PO cardizem  Cont to monitor HR   pradaxa bid    12/11:  Rate controlled  Continue cardizem  PO pradaxa bid

## 2019-12-11 NOTE — PLAN OF CARE
Pt ambulated `4 feet fwd and 4 feet bwd , with in the limitations of his Vapotherm. Oxygen sats remained greater than 90%. Pt was sob and very impulsive.

## 2019-12-11 NOTE — PROGRESS NOTES
[facility] Hospital Medicine  Progress Note    Patient Name: Nicolás Marin  MRN: [id number]  Patient Class: IP- Inpatient   Admission Date: 12/8/2019  Length of Stay: 2 days  Attending Physician: Sanchez Joseph MD  Primary Care Provider: Yuli Lau MD        Subjective:     Principal Problem:Acute on chronic respiratory failure with hypoxia        HPI:  Mr Marin is a 79 year old male with PMHx of COPD on home O 2, HTN, A Fib on Pradaxa who presented to [facility] with complaints of progressive shortness of breath that has continue to worsen.  Denies associated symptoms chest pain, shortness of breath, fever, chills, nausea or vomiting. [facility] and is a poor historian. EKG in the Emergency Room showed A Fib with RVR rate in of 135, he was started of Cardizem drip in the Emergency Room. CT of chest showed bilateral plural effusions, Rt greater that left. . Troponin negative. He is a full code, granddaughter Katharine, is surrogate decision make. [facility] of [facility].      Overview/Hospital Course:  12/10: stable on vapotherm, continue to wean as tolerated     Interval History: No acute events reported overnight.     Review of Systems   Constitutional: Negative for fatigue and fever.   Respiratory: Positive for shortness of breath (mild ).    Cardiovascular: Negative for chest pain.   Gastrointestinal: Negative for nausea and vomiting.   Skin: Negative for rash.     Objective:     Vital Signs (Most Recent):  Temp: 99 °F (37.2 °C) (12/11/19 1203)  Pulse: (!) 120 (12/11/19 1203)  Resp: 20 (12/11/19 1203)  BP: 121/68 (12/11/19 1203)  SpO2: (!) 94 % (12/11/19 1203) Vital Signs (24h Range):  Temp:  [96.9 °F (36.1 °C)-99 °F (37.2 °C)] 99 °F (37.2 °C)  Pulse:  [] 120  Resp:  [18-20] 20  SpO2:  [90 %-100 %] 94 %  BP: (115-140)/(56-69) 121/68     Weight: 98.6 kg (217 lb 6 oz)  Body mass index is 28.68 kg/m².    Intake/Output Summary  (Last 24 hours) at 12/11/2019 1255  Last data filed at 12/11/2019 0500  Gross per 24 hour   Intake 2338.83 ml   Output 600 ml   Net 1738.83 ml      Physical Exam   Constitutional: He is oriented to person, place, and time. He appears well-developed and well-nourished. No distress.   Cardiovascular: Normal rate, regular rhythm and normal heart sounds. Exam reveals no gallop and no friction rub.   No murmur heard.  Pulmonary/Chest: Effort normal and breath sounds normal. No stridor. No respiratory distress. He has no wheezes (inspiratory and expiratory ). He has no rales.   Abdominal: Soft. Bowel sounds are normal. He exhibits no distension and no mass. There is no tenderness. There is no guarding.   Musculoskeletal: He exhibits no edema.   Neurological: He is alert and oriented to person, place, and time.   Skin: Skin is warm. No rash noted. He is not diaphoretic.       Significant Labs:   Recent Lab Results       12/11/19 0417 12/11/19 0416        Anion Gap   8     Aniso Slight       Baso # 0.01       Basophilic Stippling Occasional       Basophil% 0.1       BUN, Bld   30     Calcium   9.3     Chloride   93     CO2   41  Comment:  CO2 critical result(s) called and verbal readback obtained from DAVE LEDEZMA RN  by KATERINA 12/11/2019 05:39       Creatinine   0.9     Differential Method Automated       eGFR if    >60     eGFR if non    >60  Comment:  Calculation used to obtain the estimated glomerular filtration  rate (eGFR) is the CKD-EPI equation.        Eos # 0.0       Eosinophil% 0.0       Glucose   156     Gran # (ANC) 11.0       Gran% 91.5       Hematocrit 38.2       Hemoglobin 11.2       Immature Grans (Abs) 0.07  Comment:  Mild elevation in immature granulocytes is non specific and   can be seen in a variety of conditions including stress response,   acute inflammation, trauma and pregnancy. Correlation with other   laboratory and clinical findings is essential.          Immature Granulocytes 0.6       Lymph # 0.4       Lymph% 3.1       MCH 31.1       MCHC 29.3              Mono # 0.6       Mono% 4.7       MPV 11.1       nRBC 0       Platelet Estimate Appears normal       Platelets 185       Poly Occasional       Potassium   4.5     RBC 3.60       RDW 13.2       Sodium   142     WBC 12.01           All pertinent labs within the past 24 hours have been reviewed.    Significant Imaging: I have reviewed all pertinent imaging results/findings within the past 24 hours.      Assessment/Plan:      * Acute on chronic respiratory failure with hypoxia  Admit to Telemetry   Pt on home O 2 at 2-3 L   Venti mask at 50 %, wean as tolerated  Nebs   Probable related to CHF and plural effusions   Lasix 40 mg IV BID      12/10:  Breathing treatments  Transition from solumedrol to PO prednisone  Azithromycin   Wean O2 as tolerated     12/11:  Cont breathing treatments and steroids  Azithromycin  Cont to wean fiO2 as tolerated      Pleural effusion, bilateral  CT shows bilateral plural effusion, more right than left   Lasix 40 mg IV BID       HLD (hyperlipidemia)  Continue Statin       Essential hypertension  Continue B blocker     12/10:  BP controlled     12/11:  BP controlledc     Acute on chronic congestive heart failure  Strict I & O   Daily weights  Fluid restriction   Low sodium diet   Check 2 D Echo     12/10:  Continue diuresis with lasix bid  Cont to monitor   Reviewed echo          Atrial fibrillation with RVR  Admitted with A Fib with RVR   Cardizem drip   Continue B blocker   Cardiology Consult     12/10:  cardizem drip dc and transitioned to PO cardizem  Cont to monitor HR   pradaxa bid    12/11:  Rate controlled  Continue cardizem  PO pradaxa bid           VTE Risk Mitigation (From admission, onward)         Ordered     dabigatran etexilate capsule 150 mg  2 times daily      12/09/19 0341                      Sanchez Joseph MD  Department of Hospital Medicine   [facility]

## 2019-12-11 NOTE — PLAN OF CARE
Patient in room awake alert and confused, disoriented to time place and situation.   Frequent reorientation provided per staff.   Patient on vapo derm 30L per minute at 55%.  Speech clear, able to verbalize needs and concerns.  Patient denies pain and discomfort.  Patient skin intact, oral mucus membranes pink and moist.  Brown discoloration and edema present to BLE. Capillary Refill assessed.   Patient is ambulatory, unsteady gait noted.   Sitter noted.   Patient able to position/reposition self in bed.  Frequent weight shifts  encouraged.  Educated on fall precaution and how to utilize call light.  Non-skids noted to bilateral lower extremities.  Room free from clutter, floor is dry.  Plan of care reviewed, no evidence of learning.   Bed in low position, side rails up x 2, call light in reach.  Will continue to monitor.

## 2019-12-11 NOTE — SUBJECTIVE & OBJECTIVE
Interval History: No acute events reported overnight.     Review of Systems   Constitutional: Negative for fatigue and fever.   Respiratory: Positive for shortness of breath (mild ).    Cardiovascular: Negative for chest pain.   Gastrointestinal: Negative for nausea and vomiting.   Skin: Negative for rash.     Objective:     Vital Signs (Most Recent):  Temp: 99 °F (37.2 °C) (12/11/19 1203)  Pulse: (!) 120 (12/11/19 1203)  Resp: 20 (12/11/19 1203)  BP: 121/68 (12/11/19 1203)  SpO2: (!) 94 % (12/11/19 1203) Vital Signs (24h Range):  Temp:  [96.9 °F (36.1 °C)-99 °F (37.2 °C)] 99 °F (37.2 °C)  Pulse:  [] 120  Resp:  [18-20] 20  SpO2:  [90 %-100 %] 94 %  BP: (115-140)/(56-69) 121/68     Weight: 98.6 kg (217 lb 6 oz)  Body mass index is 28.68 kg/m².    Intake/Output Summary (Last 24 hours) at 12/11/2019 1255  Last data filed at 12/11/2019 0500  Gross per 24 hour   Intake 2338.83 ml   Output 600 ml   Net 1738.83 ml      Physical Exam   Constitutional: He is oriented to person, place, and time. He appears well-developed and well-nourished. No distress.   Cardiovascular: Normal rate, regular rhythm and normal heart sounds. Exam reveals no gallop and no friction rub.   No murmur heard.  Pulmonary/Chest: Effort normal and breath sounds normal. No stridor. No respiratory distress. He has no wheezes (inspiratory and expiratory ). He has no rales.   Abdominal: Soft. Bowel sounds are normal. He exhibits no distension and no mass. There is no tenderness. There is no guarding.   Musculoskeletal: He exhibits no edema.   Neurological: He is alert and oriented to person, place, and time.   Skin: Skin is warm. No rash noted. He is not diaphoretic.       Significant Labs:   Recent Lab Results       12/11/19 0417 12/11/19 0416        Anion Gap   8     Aniso Slight       Baso # 0.01       Basophilic Stippling Occasional       Basophil% 0.1       BUN, Bld   30     Calcium   9.3     Chloride   93     CO2   41  Comment:  CO2  critical result(s) called and verbal readback obtained from DAVE LEDEZMA RN  by KATERINA 12/11/2019 05:39       Creatinine   0.9     Differential Method Automated       eGFR if    >60     eGFR if non    >60  Comment:  Calculation used to obtain the estimated glomerular filtration  rate (eGFR) is the CKD-EPI equation.        Eos # 0.0       Eosinophil% 0.0       Glucose   156     Gran # (ANC) 11.0       Gran% 91.5       Hematocrit 38.2       Hemoglobin 11.2       Immature Grans (Abs) 0.07  Comment:  Mild elevation in immature granulocytes is non specific and   can be seen in a variety of conditions including stress response,   acute inflammation, trauma and pregnancy. Correlation with other   laboratory and clinical findings is essential.         Immature Granulocytes 0.6       Lymph # 0.4       Lymph% 3.1       MCH 31.1       MCHC 29.3              Mono # 0.6       Mono% 4.7       MPV 11.1       nRBC 0       Platelet Estimate Appears normal       Platelets 185       Poly Occasional       Potassium   4.5     RBC 3.60       RDW 13.2       Sodium   142     WBC 12.01           All pertinent labs within the past 24 hours have been reviewed.    Significant Imaging: I have reviewed all pertinent imaging results/findings within the past 24 hours.

## 2019-12-12 PROBLEM — R53.1 WEAKNESS: Status: ACTIVE | Noted: 2019-12-12

## 2019-12-12 LAB
ANION GAP SERPL CALC-SCNC: 7 MMOL/L (ref 8–16)
BASOPHILS # BLD AUTO: 0 K/UL (ref 0–0.2)
BASOPHILS NFR BLD: 0 % (ref 0–1.9)
BUN SERPL-MCNC: 29 MG/DL (ref 8–23)
CALCIUM SERPL-MCNC: 8.8 MG/DL (ref 8.7–10.5)
CHLORIDE SERPL-SCNC: 92 MMOL/L (ref 95–110)
CO2 SERPL-SCNC: 42 MMOL/L (ref 23–29)
CREAT SERPL-MCNC: 0.7 MG/DL (ref 0.5–1.4)
DIFFERENTIAL METHOD: ABNORMAL
EOSINOPHIL # BLD AUTO: 0 K/UL (ref 0–0.5)
EOSINOPHIL NFR BLD: 0 % (ref 0–8)
ERYTHROCYTE [DISTWIDTH] IN BLOOD BY AUTOMATED COUNT: 13.2 % (ref 11.5–14.5)
EST. GFR  (AFRICAN AMERICAN): >60 ML/MIN/1.73 M^2
EST. GFR  (NON AFRICAN AMERICAN): >60 ML/MIN/1.73 M^2
GLUCOSE SERPL-MCNC: 108 MG/DL (ref 70–110)
HCT VFR BLD AUTO: 36 % (ref 40–54)
HGB BLD-MCNC: 10.4 G/DL (ref 14–18)
IMM GRANULOCYTES # BLD AUTO: 0.06 K/UL (ref 0–0.04)
IMM GRANULOCYTES NFR BLD AUTO: 0.6 % (ref 0–0.5)
LYMPHOCYTES # BLD AUTO: 0.7 K/UL (ref 1–4.8)
LYMPHOCYTES NFR BLD: 7.3 % (ref 18–48)
MCH RBC QN AUTO: 30.1 PG (ref 27–31)
MCHC RBC AUTO-ENTMCNC: 28.9 G/DL (ref 32–36)
MCV RBC AUTO: 104 FL (ref 82–98)
MONOCYTES # BLD AUTO: 1 K/UL (ref 0.3–1)
MONOCYTES NFR BLD: 10.1 % (ref 4–15)
NEUTROPHILS # BLD AUTO: 8.3 K/UL (ref 1.8–7.7)
NEUTROPHILS NFR BLD: 82 % (ref 38–73)
NRBC BLD-RTO: 0 /100 WBC
PLATELET # BLD AUTO: 180 K/UL (ref 150–350)
PMV BLD AUTO: 10.6 FL (ref 9.2–12.9)
POTASSIUM SERPL-SCNC: 4 MMOL/L (ref 3.5–5.1)
RBC # BLD AUTO: 3.45 M/UL (ref 4.6–6.2)
SODIUM SERPL-SCNC: 141 MMOL/L (ref 136–145)
WBC # BLD AUTO: 10.13 K/UL (ref 3.9–12.7)

## 2019-12-12 PROCEDURE — 25000003 PHARM REV CODE 250: Performed by: NURSE PRACTITIONER

## 2019-12-12 PROCEDURE — 25000242 PHARM REV CODE 250 ALT 637 W/ HCPCS: Performed by: NURSE PRACTITIONER

## 2019-12-12 PROCEDURE — 25000003 PHARM REV CODE 250: Performed by: PHYSICIAN ASSISTANT

## 2019-12-12 PROCEDURE — 97110 THERAPEUTIC EXERCISES: CPT

## 2019-12-12 PROCEDURE — 36415 COLL VENOUS BLD VENIPUNCTURE: CPT

## 2019-12-12 PROCEDURE — 94640 AIRWAY INHALATION TREATMENT: CPT

## 2019-12-12 PROCEDURE — 80048 BASIC METABOLIC PNL TOTAL CA: CPT

## 2019-12-12 PROCEDURE — 94761 N-INVAS EAR/PLS OXIMETRY MLT: CPT

## 2019-12-12 PROCEDURE — 85025 COMPLETE CBC W/AUTO DIFF WBC: CPT

## 2019-12-12 PROCEDURE — 27100171 HC OXYGEN HIGH FLOW UP TO 24 HOURS

## 2019-12-12 PROCEDURE — 99900035 HC TECH TIME PER 15 MIN (STAT)

## 2019-12-12 PROCEDURE — 63600175 PHARM REV CODE 636 W HCPCS: Performed by: NURSE PRACTITIONER

## 2019-12-12 PROCEDURE — 25000242 PHARM REV CODE 250 ALT 637 W/ HCPCS: Performed by: FAMILY MEDICINE

## 2019-12-12 PROCEDURE — 21400001 HC TELEMETRY ROOM

## 2019-12-12 PROCEDURE — 63600175 PHARM REV CODE 636 W HCPCS: Performed by: FAMILY MEDICINE

## 2019-12-12 RX ORDER — IPRATROPIUM BROMIDE AND ALBUTEROL SULFATE 2.5; .5 MG/3ML; MG/3ML
3 SOLUTION RESPIRATORY (INHALATION)
Status: DISCONTINUED | OUTPATIENT
Start: 2019-12-12 | End: 2019-12-17 | Stop reason: HOSPADM

## 2019-12-12 RX ADMIN — BUDESONIDE 0.5 MG: 0.5 INHALANT RESPIRATORY (INHALATION) at 08:12

## 2019-12-12 RX ADMIN — PANTOPRAZOLE SODIUM 40 MG: 40 TABLET, DELAYED RELEASE ORAL at 08:12

## 2019-12-12 RX ADMIN — IPRATROPIUM BROMIDE AND ALBUTEROL SULFATE 3 ML: .5; 3 SOLUTION RESPIRATORY (INHALATION) at 08:12

## 2019-12-12 RX ADMIN — ATORVASTATIN CALCIUM 40 MG: 40 TABLET, FILM COATED ORAL at 08:12

## 2019-12-12 RX ADMIN — DILTIAZEM HYDROCHLORIDE 180 MG: 180 CAPSULE, COATED, EXTENDED RELEASE ORAL at 08:12

## 2019-12-12 RX ADMIN — PREDNISONE 20 MG: 20 TABLET ORAL at 08:12

## 2019-12-12 RX ADMIN — DABIGATRAN ETEXILATE MESYLATE 150 MG: 150 CAPSULE ORAL at 08:12

## 2019-12-12 RX ADMIN — THERA TABS 1 TABLET: TAB at 08:12

## 2019-12-12 RX ADMIN — FINASTERIDE 5 MG: 5 TABLET, FILM COATED ORAL at 08:12

## 2019-12-12 RX ADMIN — IPRATROPIUM BROMIDE AND ALBUTEROL SULFATE 3 ML: .5; 3 SOLUTION RESPIRATORY (INHALATION) at 11:12

## 2019-12-12 RX ADMIN — METOPROLOL SUCCINATE 100 MG: 50 TABLET, FILM COATED, EXTENDED RELEASE ORAL at 08:12

## 2019-12-12 RX ADMIN — FUROSEMIDE 40 MG: 10 INJECTION, SOLUTION INTRAMUSCULAR; INTRAVENOUS at 05:12

## 2019-12-12 RX ADMIN — DABIGATRAN ETEXILATE MESYLATE 150 MG: 150 CAPSULE ORAL at 09:12

## 2019-12-12 RX ADMIN — FUROSEMIDE 40 MG: 10 INJECTION, SOLUTION INTRAMUSCULAR; INTRAVENOUS at 08:12

## 2019-12-12 RX ADMIN — TAMSULOSIN HYDROCHLORIDE 0.4 MG: 0.4 CAPSULE ORAL at 08:12

## 2019-12-12 NOTE — PLAN OF CARE
CM attempted multiple time to contact the patient's granddaughter, Katharine.  Call goes directly to voicemail and it is not set up.  Form 10-10EC needs to be completed but patient unable to understand

## 2019-12-12 NOTE — ASSESSMENT & PLAN NOTE
Admit to Telemetry   Pt on home O 2 at 2-3 L   Venti mask at 50 %, wean as tolerated  Nebs   Probable related to CHF and plural effusions   Lasix 40 mg IV BID      12/10:  Breathing treatments  Transition from solumedrol to PO prednisone  Azithromycin   Wean O2 as tolerated     12/11:  Cont breathing treatments and steroids  Azithromycin  Cont to wean fiO2 as tolerated    12/12:  Decreased breathing txt to q6h awake   Completed azithromycin  Currently down to fiO2 of 30%  Will continue to wean   Pt on 3L at home

## 2019-12-12 NOTE — PLAN OF CARE
Pt AAO x 1.  VSS.  Pt remained afebrile throughout this shift.   Pt remained free of falls this shift.   Pt c/o of no pain this shift.  Plan of care reviewed. Patient verbalizes understanding.   Pt moving/turing with assistance. Frequent weight shifting encouraged.  Patient AFib on monitor.   Bed low, side rails up x 2, wheels locked, call light in reach.   Bed alarm maintained for safety.   Patient instructed to call for assistance.   Hourly rounding completed.   24 hour chart check completed.  Will continue to monitor.

## 2019-12-12 NOTE — PLAN OF CARE
PT SEATED EOB DOING BREATHING TREATMENT. PT LIMITED TO ACTIVITIES, SO PERFORMED SEATED UE/LE EXERCISES 2X10 REPS IN ALL PLANES TO MAINTAIN JOINT INTEGRITY AND PREVENT JOINT STIFFNESS. STAFF PRESENT AND ALL NEEDS MET.

## 2019-12-12 NOTE — PT/OT/SLP PROGRESS
"Occupational Therapy  Treatment    Nicolás Marin   MRN: [id number]   Admitting Diagnosis: Acute on chronic respiratory failure with hypoxia    OT Date of Treatment: 12/12/19   OT Start Time: 1120  OT Stop Time: 1135  OT Total Time (min): 15 min    Billable Minutes:  Therapeutic Exercise 15    General Precautions: Standard, fall, respiratory  Orthopedic Precautions: N/A  Braces: N/A         Subjective:  Communicated with NURSE ANDREW prior to session.    Pain/Comfort  Pain Rating 1: 0/10  Pain Rating Post-Intervention 1: 0/10    Objective:  Patient found with: telemetry, peripheral IV, oxygen(VAPOTHERM)     Therapeutic Activities and Exercises:  PT SEATED EOB DOING BREATHING TREATMENT. PT LIMITED TO ACTIVITIES, SO PERFORMED SEATED UE/LE EXERCISES 2X10 REPS IN ALL PLANES TO MAINTAIN JOINT INTEGRITY AND PREVENT JOINT STIFFNESS. STAFF PRESENT AND ALL NEEDS MET.    AM-PAC 6 CLICK ADL   How much help from another person does this patient currently need?   1 = Unable, Total/Dependent Assistance  2 = A lot, Maximum/Moderate Assistance  3 = A little, Minimum/Contact Guard/Supervision  4 = None, Modified [place]/Independent    Putting on and taking off regular lower body clothing? : 3  Bathing (including washing, rinsing, drying)?: 3  Toileting, which includes using toilet, bedpan, or urinal? : 3  Putting on and taking off regular upper body clothing?: 3  Taking care of personal grooming such as brushing teeth?: 4  Eating meals?: 4  Daily Activity Total Score: 20     AM-PAC Raw Score CMS "G-Code Modifier Level of Impairment Assistance   6 % Total / Unable   7 - 8 CM 80 - 100% Maximal Assist   9-13 CL 60 - 80% Moderate Assist   14 - 19 CK 40 - 60% Moderate Assist   20 - 22 CJ 20 - 40% Minimal Assist   23 CI 1-20% SBA / CGA   24 CH 0% Independent/ Mod I       Patient left SEATED EOB with all lines intact, call button in reach and NURSING STAFF present    ASSESSMENT:  Nicolás Marin is a 79 y.o. male with a medical diagnosis of " Acute on chronic respiratory failure with hypoxia and presents with DEBILITY, IMPAIRED ADLS, FUNCTIONAL MOBILITY, AND IMPAIRED SAFETY AWARENESS. PT WILL BENEFIT FROM CONTINUED SKILLED OT SERVICES TO INCREASE FUNCTIONAL INDEPENDENCE AND SAFETY AWARENESS.    Rehab identified problem list/impairments: Rehab identified problem list/impairments: weakness, impaired endurance, impaired functional mobilty, gait instability, impaired self care skills, impaired balance, decreased safety awareness, decreased upper extremity function, decreased lower extremity function    Rehab potential is good.    Activity tolerance: Good    Discharge recommendations: Discharge Facility/Level of Care Needs: home health OT     Barriers to discharge: Barriers to Discharge: Decreased caregiver support    Equipment recommendations: none     GOALS:   Multidisciplinary Problems     Occupational Therapy Goals        Problem: Occupational Therapy Goal    Goal Priority Disciplines Outcome Interventions   Occupational Therapy Goal     OT, PT/OT Ongoing, Progressing    Description:  ot goals to be met by 12-17-19  sba with ue dressing  sba with le dressing  Pt will tolerate 1 set x 15 reps b ue rom exercise  sba with bsc/ toilet  t/f's                    Plan:  Patient to be seen 3 x/week to address the above listed problems via self-care/home management, therapeutic activities, therapeutic exercises  Plan of Care expires: 12/17/19  Plan of Care reviewed with: patient    OT G-codes  Functional Assessment Tool Used: [place] AM-PAC  Score: 20  Functional Limitation: Self care    Jen Randolph OT  12/12/2019

## 2019-12-12 NOTE — PLAN OF CARE
CM provided patient with the 10-10 EC form and told him it needed to be completed.  RUDI explained that it would need to be provided tomorrow [facility].  Patient will likely not complete the form.  CM notified the patient that without the paperwork, he would have to discharge home and services for home health would need to be arranged by the [facility].    RUDI left another message for Jess Blanton at the [facility] @[phone] ext [phone]

## 2019-12-12 NOTE — PROGRESS NOTES
[facility] Hospital Medicine  Progress Note    Patient Name: Nicolás Marin  MRN: [id number]  Patient Class: IP- Inpatient   Admission Date: 12/8/2019  Length of Stay: 3 days  Attending Physician: Sanchez Joseph MD  Primary Care Provider: Yuli Lau MD        Subjective:     Principal Problem:Acute on chronic respiratory failure with hypoxia        HPI:  Mr Marin is a 79 year old male with PMHx of COPD on home O 2, HTN, A Fib on Pradaxa who presented to [facility] with complaints of progressive shortness of breath that has continue to worsen.  Denies associated symptoms chest pain, shortness of breath, fever, chills, nausea or vomiting. [facility] and is a poor historian. EKG in the Emergency Room showed A Fib with RVR rate in of 135, he was started of Cardizem drip in the Emergency Room. CT of chest showed bilateral plural effusions, Rt greater that left. . Troponin negative. He is a full code, granddaughter Katharine, is surrogate decision make. [facility]      Overview/Hospital Course:  12/10: stable on vapotherm, continue to wean as tolerated     Interval History: No acute events reported overnight. Currently being weaned off O2.     Review of Systems   Constitutional: Negative for fatigue and fever.   Respiratory: Negative for shortness of breath.    Cardiovascular: Negative for chest pain.   Gastrointestinal: Negative for nausea and vomiting.   Skin: Negative for rash.     Objective:     Vital Signs (Most Recent):  Temp: 97.6 °F (36.4 °C) (12/12/19 1139)  Pulse: 103 (12/12/19 1139)  Resp: 17 (12/12/19 1139)  BP: 123/70 (12/12/19 1139)  SpO2: 98 % (12/12/19 1139) Vital Signs (24h Range):  Temp:  [97 °F (36.1 °C)-98.4 °F (36.9 °C)] 97.6 °F (36.4 °C)  Pulse:  [] 103  Resp:  [17-20] 17  SpO2:  [90 %-98 %] 98 %  BP: (117-141)/(65-87) 123/70     Weight: 98.6 kg (217 lb 6 oz)  Body mass index is 28.68  kg/m².    Intake/Output Summary (Last 24 hours) at 12/12/2019 1244  Last data filed at 12/12/2019 1200  Gross per 24 hour   Intake 956 ml   Output 1950 ml   Net -994 ml      Physical Exam   Constitutional: He is oriented to person, place, and time. He appears well-developed and well-nourished. No distress.   Cardiovascular: Normal rate, regular rhythm and normal heart sounds. Exam reveals no gallop and no friction rub.   No murmur heard.  Pulmonary/Chest: Effort normal. No stridor. No respiratory distress. He has no wheezes. He has no rales.   Abdominal: Soft. Bowel sounds are normal. He exhibits no distension and no mass. There is no tenderness. There is no guarding.   Musculoskeletal: He exhibits no edema.   Neurological: He is alert and oriented to person, place, and time.   Skin: Skin is warm. No rash noted. He is not diaphoretic.       Significant Labs:   Recent Lab Results       12/12/19  0530        Anion Gap 7     Baso # 0.00     Basophil% 0.0     BUN, Bld 29     Calcium 8.8     Chloride 92     CO2 42  Comment:  CO2 critical result(s) called and verbal readback obtained from   JOHN MARIE RN  by SHANNAN 12/12/2019 07:16       Creatinine 0.7     Differential Method Automated     eGFR if  >60     eGFR if non  >60  Comment:  Calculation used to obtain the estimated glomerular filtration  rate (eGFR) is the CKD-EPI equation.        Eos # 0.0     Eosinophil% 0.0     Glucose 108     Gran # (ANC) 8.3     Gran% 82.0     Hematocrit 36.0     Hemoglobin 10.4     Immature Grans (Abs) 0.06  Comment:  Mild elevation in immature granulocytes is non specific and   can be seen in a variety of conditions including stress response,   acute inflammation, trauma and pregnancy. Correlation with other   laboratory and clinical findings is essential.       Immature Granulocytes 0.6     Lymph # 0.7     Lymph% 7.3     MCH 30.1     MCHC 28.9          Mono # 1.0     Mono% 10.1     MPV 10.6     nRBC  0     Platelets 180     Potassium 4.0     RBC 3.45     RDW 13.2     Sodium 141     WBC 10.13         All pertinent labs within the past 24 hours have been reviewed.    Significant Imaging: I have reviewed all pertinent imaging results/findings within the past 24 hours.      Assessment/Plan:      * Acute on chronic respiratory failure with hypoxia  Admit to Telemetry   Pt on home O 2 at 2-3 L   Venti mask at 50 %, wean as tolerated  Nebs   Probable related to CHF and plural effusions   Lasix 40 mg IV BID      12/10:  Breathing treatments  Transition from solumedrol to PO prednisone  Azithromycin   Wean O2 as tolerated     12/11:  Cont breathing treatments and steroids  Azithromycin  Cont to wean fiO2 as tolerated    12/12:  Decreased breathing txt to q6h awake   Completed azithromycin  Currently down to fiO2 of 30%  Will continue to wean   Pt on 3L at home      Weakness  12/12:  Awaiting placement for SNF      Pleural effusion, bilateral  CT shows bilateral plural effusion, more right than left   Lasix 40 mg IV BID       HLD (hyperlipidemia)  Continue Statin       Essential hypertension  Continue B blocker     12/10:  BP controlled     12/11:  BP controlledc     Acute on chronic congestive heart failure  Strict I & O   Daily weights  Fluid restriction   Low sodium diet   Check 2 D Echo     12/10:  Continue diuresis with lasix bid  Cont to monitor   Reviewed echo          Atrial fibrillation with RVR  Admitted with A Fib with RVR   Cardizem drip   Continue B blocker   Cardiology Consult     12/10:  cardizem drip dc and transitioned to PO cardizem  Cont to monitor HR   pradaxa bid    12/11:  Rate controlled  Continue cardizem  PO pradaxa bid           VTE Risk Mitigation (From admission, onward)         Ordered     dabigatran etexilate capsule 150 mg  2 times daily      12/09/19 0341                      Sanchez Joseph MD  Department of Hospital Medicine   [facility]

## 2019-12-12 NOTE — SUBJECTIVE & OBJECTIVE
Interval History: No acute events reported overnight. Currently being weaned off O2.     Review of Systems   Constitutional: Negative for fatigue and fever.   Respiratory: Negative for shortness of breath.    Cardiovascular: Negative for chest pain.   Gastrointestinal: Negative for nausea and vomiting.   Skin: Negative for rash.     Objective:     Vital Signs (Most Recent):  Temp: 97.6 °F (36.4 °C) (12/12/19 1139)  Pulse: 103 (12/12/19 1139)  Resp: 17 (12/12/19 1139)  BP: 123/70 (12/12/19 1139)  SpO2: 98 % (12/12/19 1139) Vital Signs (24h Range):  Temp:  [97 °F (36.1 °C)-98.4 °F (36.9 °C)] 97.6 °F (36.4 °C)  Pulse:  [] 103  Resp:  [17-20] 17  SpO2:  [90 %-98 %] 98 %  BP: (117-141)/(65-87) 123/70     Weight: 98.6 kg (217 lb 6 oz)  Body mass index is 28.68 kg/m².    Intake/Output Summary (Last 24 hours) at 12/12/2019 1244  Last data filed at 12/12/2019 1200  Gross per 24 hour   Intake 956 ml   Output 1950 ml   Net -994 ml      Physical Exam   Constitutional: He is oriented to person, place, and time. He appears well-developed and well-nourished. No distress.   Cardiovascular: Normal rate, regular rhythm and normal heart sounds. Exam reveals no gallop and no friction rub.   No murmur heard.  Pulmonary/Chest: Effort normal. No stridor. No respiratory distress. He has no wheezes. He has no rales.   Abdominal: Soft. Bowel sounds are normal. He exhibits no distension and no mass. There is no tenderness. There is no guarding.   Musculoskeletal: He exhibits no edema.   Neurological: He is alert and oriented to person, place, and time.   Skin: Skin is warm. No rash noted. He is not diaphoretic.       Significant Labs:   Recent Lab Results       12/12/19  0530        Anion Gap 7     Baso # 0.00     Basophil% 0.0     BUN, Bld 29     Calcium 8.8     Chloride 92     CO2 42  Comment:  CO2 critical result(s) called and verbal readback obtained from   JOHN MARIE RN  by HCA 12/12/2019 07:16       Creatinine 0.7      Differential Method Automated     eGFR if  >60     eGFR if non  >60  Comment:  Calculation used to obtain the estimated glomerular filtration  rate (eGFR) is the CKD-EPI equation.        Eos # 0.0     Eosinophil% 0.0     Glucose 108     Gran # (ANC) 8.3     Gran% 82.0     Hematocrit 36.0     Hemoglobin 10.4     Immature Grans (Abs) 0.06  Comment:  Mild elevation in immature granulocytes is non specific and   can be seen in a variety of conditions including stress response,   acute inflammation, trauma and pregnancy. Correlation with other   laboratory and clinical findings is essential.       Immature Granulocytes 0.6     Lymph # 0.7     Lymph% 7.3     MCH 30.1     MCHC 28.9          Mono # 1.0     Mono% 10.1     MPV 10.6     nRBC 0     Platelets 180     Potassium 4.0     RBC 3.45     RDW 13.2     Sodium 141     WBC 10.13         All pertinent labs within the past 24 hours have been reviewed.    Significant Imaging: I have reviewed all pertinent imaging results/findings within the past 24 hours.

## 2019-12-12 NOTE — PT/OT/SLP PROGRESS
Physical Therapy  Treatment    Nicolás Marin   MRN: [id number]   Admitting Diagnosis: Acute on chronic respiratory failure with hypoxia    PT Received On: 12/12/19  PT Start Time: 1115     PT Stop Time: 1125    PT Total Time (min): 10 min       Billable Minutes:  Therapeutic Exercise 10    Treatment Type: Treatment  PT/PTA: PT          General Precautions: Standard, fall, respiratory  Orthopedic Precautions: N/A   Braces: N/A    Subjective:  Communicated with NURSE ANDREW prior to session.  Pain/Comfort  Pain Rating 1: 0/10    Objective:   Patient found with: telemetry, oxygen, peripheral IV(VAPOTHERM)    Functional Mobility:  Therapeutic Activities and Exercises:  PT FOUND SEATED AT EOB UPON ARRIVAL, LIMITED BY VAPOTHERM, CURRENTLY RECEIVING BREATHING TX., PT C/O SOB FROM ACTIVITY WHILE STANDING AT SINK WITH AIDE, PT EDUCATED IN AND PERFORMED BLE THEREX X 20 REPS AROM WITH REST. PT LEFT SEATED WITH ALL NEEDS MET, 1:1 AIDE PRESENT    AM-PAC 6 CLICK MOBILITY  How much help from another person does this patient currently need?   1 = Unable, Total/Dependent Assistance  2 = A lot, Maximum/Moderate Assistance  3 = A little, Minimum/Contact Guard/Supervision  4 = None, Modified [place]/Independent    Turning over in bed (including adjusting bedclothes, sheets and blankets)?: 4  Sitting down on and standing up from a chair with arms (e.g., wheelchair, bedside commode, etc.): 3  Moving from lying on back to sitting on the side of the bed?: 4  Moving to and from a bed to a chair (including a wheelchair)?: 3  Need to walk in hospital room?: 3  Climbing 3-5 steps with a railing?: 1  Basic Mobility Total Score: 18    AM-PAC Raw Score CMS G-Code Modifier Level of Impairment Assistance   6 % Total / Unable   7 - 9 CM 80 - 100% Maximal Assist   10 - 14 CL 60 - 80% Moderate Assist   15 - 19 CK 40 - 60% Moderate Assist   20 - 22 CJ 20 - 40% Minimal Assist   23 CI 1-20% SBA / CGA   24 CH 0% Independent/ Mod I     Patient left  SEATED AT EOB with all lines intact, NURSE notified and 1:1 AIDE present.    Assessment:  Nicolás Marin is a 79 y.o. male with a medical diagnosis of Acute on chronic respiratory failure with hypoxia and presents with IMPAIRED FUNCTIONAL MOBILITY. PT WILL BENEFIT FROM CONT. SKILLED P.T. TO ADDRESS IMPAIRMENTS    Rehab identified problem list/impairments: Rehab identified problem list/impairments: weakness, impaired endurance, impaired functional mobilty, gait instability, impaired balance, decreased coordination, decreased safety awareness    Rehab potential is good.    Activity tolerance: Good    Discharge recommendations: Discharge Facility/Level of Care Needs: home health PT     Barriers to discharge:      Equipment recommendations: Equipment Needed After Discharge: none     GOALS:   Multidisciplinary Problems     Physical Therapy Goals        Problem: Physical Therapy Goal    Goal Priority Disciplines Outcome Goal Variances Interventions   Physical Therapy Goal     PT, PT/OT Ongoing, Progressing     Description:  PT WILL BE SEEN FOR P.T. FOR A MIN OF 5 OUT OF 7 DAYS A WEEK  LT19  1. PT WILL COMPLETE BED MOBILITY MITCH  2. PT WILL T/F TO CHAIR WITH RW AND MITCH  3. PT WILL GT TRAIN X 15' WITH RW AND CGA  4. PT WILL COMPLETE B LE TE X 20 REPS                    PLAN:    Patient to be seen 5 x/week  to address the above listed problems via gait training, therapeutic activities, therapeutic exercises  Plan of Care expires: 19  Plan of Care reviewed with: patient      Katharinemegan Tanard, PT  2019

## 2019-12-13 LAB
ANION GAP SERPL CALC-SCNC: 9 MMOL/L (ref 8–16)
BASOPHILS # BLD AUTO: 0 K/UL (ref 0–0.2)
BASOPHILS NFR BLD: 0 % (ref 0–1.9)
BUN SERPL-MCNC: 23 MG/DL (ref 8–23)
CALCIUM SERPL-MCNC: 8.7 MG/DL (ref 8.7–10.5)
CHLORIDE SERPL-SCNC: 92 MMOL/L (ref 95–110)
CO2 SERPL-SCNC: 42 MMOL/L (ref 23–29)
CREAT SERPL-MCNC: 0.7 MG/DL (ref 0.5–1.4)
DIFFERENTIAL METHOD: ABNORMAL
EOSINOPHIL # BLD AUTO: 0 K/UL (ref 0–0.5)
EOSINOPHIL NFR BLD: 0.2 % (ref 0–8)
ERYTHROCYTE [DISTWIDTH] IN BLOOD BY AUTOMATED COUNT: 13.2 % (ref 11.5–14.5)
EST. GFR  (AFRICAN AMERICAN): >60 ML/MIN/1.73 M^2
EST. GFR  (NON AFRICAN AMERICAN): >60 ML/MIN/1.73 M^2
GLUCOSE SERPL-MCNC: 96 MG/DL (ref 70–110)
HCT VFR BLD AUTO: 36.8 % (ref 40–54)
HGB BLD-MCNC: 10.8 G/DL (ref 14–18)
IMM GRANULOCYTES # BLD AUTO: 0.04 K/UL (ref 0–0.04)
IMM GRANULOCYTES NFR BLD AUTO: 0.5 % (ref 0–0.5)
LYMPHOCYTES # BLD AUTO: 1.4 K/UL (ref 1–4.8)
LYMPHOCYTES NFR BLD: 16.5 % (ref 18–48)
MCH RBC QN AUTO: 30.1 PG (ref 27–31)
MCHC RBC AUTO-ENTMCNC: 29.3 G/DL (ref 32–36)
MCV RBC AUTO: 103 FL (ref 82–98)
MONOCYTES # BLD AUTO: 1.2 K/UL (ref 0.3–1)
MONOCYTES NFR BLD: 14.8 % (ref 4–15)
NEUTROPHILS # BLD AUTO: 5.6 K/UL (ref 1.8–7.7)
NEUTROPHILS NFR BLD: 68 % (ref 38–73)
NRBC BLD-RTO: 0 /100 WBC
PLATELET # BLD AUTO: 184 K/UL (ref 150–350)
PMV BLD AUTO: 10.3 FL (ref 9.2–12.9)
POTASSIUM SERPL-SCNC: 3.9 MMOL/L (ref 3.5–5.1)
RBC # BLD AUTO: 3.59 M/UL (ref 4.6–6.2)
SODIUM SERPL-SCNC: 143 MMOL/L (ref 136–145)
WBC # BLD AUTO: 8.24 K/UL (ref 3.9–12.7)

## 2019-12-13 PROCEDURE — 63600175 PHARM REV CODE 636 W HCPCS: Performed by: NURSE PRACTITIONER

## 2019-12-13 PROCEDURE — 25000242 PHARM REV CODE 250 ALT 637 W/ HCPCS: Performed by: NURSE PRACTITIONER

## 2019-12-13 PROCEDURE — 25000003 PHARM REV CODE 250: Performed by: PHYSICIAN ASSISTANT

## 2019-12-13 PROCEDURE — 25000242 PHARM REV CODE 250 ALT 637 W/ HCPCS: Performed by: FAMILY MEDICINE

## 2019-12-13 PROCEDURE — 94761 N-INVAS EAR/PLS OXIMETRY MLT: CPT

## 2019-12-13 PROCEDURE — 94644 CONT INHLJ TX 1ST HOUR: CPT

## 2019-12-13 PROCEDURE — 97535 SELF CARE MNGMENT TRAINING: CPT

## 2019-12-13 PROCEDURE — 63600175 PHARM REV CODE 636 W HCPCS: Performed by: FAMILY MEDICINE

## 2019-12-13 PROCEDURE — 27100171 HC OXYGEN HIGH FLOW UP TO 24 HOURS

## 2019-12-13 PROCEDURE — 80048 BASIC METABOLIC PNL TOTAL CA: CPT

## 2019-12-13 PROCEDURE — 21400001 HC TELEMETRY ROOM

## 2019-12-13 PROCEDURE — 85025 COMPLETE CBC W/AUTO DIFF WBC: CPT

## 2019-12-13 PROCEDURE — 36415 COLL VENOUS BLD VENIPUNCTURE: CPT

## 2019-12-13 PROCEDURE — 97110 THERAPEUTIC EXERCISES: CPT

## 2019-12-13 PROCEDURE — 99900035 HC TECH TIME PER 15 MIN (STAT)

## 2019-12-13 PROCEDURE — 25000003 PHARM REV CODE 250: Performed by: NURSE PRACTITIONER

## 2019-12-13 PROCEDURE — 97530 THERAPEUTIC ACTIVITIES: CPT

## 2019-12-13 PROCEDURE — 94640 AIRWAY INHALATION TREATMENT: CPT

## 2019-12-13 PROCEDURE — 27100092 HC HIGH FLOW DELIVERY CANNULA

## 2019-12-13 RX ADMIN — DABIGATRAN ETEXILATE MESYLATE 150 MG: 150 CAPSULE ORAL at 08:12

## 2019-12-13 RX ADMIN — TAMSULOSIN HYDROCHLORIDE 0.4 MG: 0.4 CAPSULE ORAL at 08:12

## 2019-12-13 RX ADMIN — FUROSEMIDE 40 MG: 10 INJECTION, SOLUTION INTRAMUSCULAR; INTRAVENOUS at 08:12

## 2019-12-13 RX ADMIN — ATORVASTATIN CALCIUM 40 MG: 40 TABLET, FILM COATED ORAL at 08:12

## 2019-12-13 RX ADMIN — IPRATROPIUM BROMIDE AND ALBUTEROL SULFATE 3 ML: .5; 3 SOLUTION RESPIRATORY (INHALATION) at 12:12

## 2019-12-13 RX ADMIN — DILTIAZEM HYDROCHLORIDE 180 MG: 180 CAPSULE, COATED, EXTENDED RELEASE ORAL at 08:12

## 2019-12-13 RX ADMIN — IPRATROPIUM BROMIDE AND ALBUTEROL SULFATE 3 ML: .5; 3 SOLUTION RESPIRATORY (INHALATION) at 07:12

## 2019-12-13 RX ADMIN — BUDESONIDE 0.5 MG: 0.5 INHALANT RESPIRATORY (INHALATION) at 07:12

## 2019-12-13 RX ADMIN — PANTOPRAZOLE SODIUM 40 MG: 40 TABLET, DELAYED RELEASE ORAL at 08:12

## 2019-12-13 RX ADMIN — FINASTERIDE 5 MG: 5 TABLET, FILM COATED ORAL at 08:12

## 2019-12-13 RX ADMIN — THERA TABS 1 TABLET: TAB at 08:12

## 2019-12-13 RX ADMIN — FUROSEMIDE 40 MG: 10 INJECTION, SOLUTION INTRAMUSCULAR; INTRAVENOUS at 05:12

## 2019-12-13 RX ADMIN — METOPROLOL SUCCINATE 100 MG: 50 TABLET, FILM COATED, EXTENDED RELEASE ORAL at 08:12

## 2019-12-13 RX ADMIN — PREDNISONE 20 MG: 20 TABLET ORAL at 08:12

## 2019-12-13 NOTE — ASSESSMENT & PLAN NOTE
Continue B blocker     12/10:  BP controlled     12/11:  BP controlledc     12/12:  BP elevated this am however it has been controlled most of admission  Cont current management, cont to monitor

## 2019-12-13 NOTE — PLAN OF CARE
Pt met in room with sitters present and vapotherm donned. Pt performed bed mobility SBA. Stood with min a (HHA) and took ~4 steps to sink, where he stood and performed grooming/dentures care with CGA. Pt walked back to bed 4 feet with min A and left seated EOB, completing BUE arom 2x10 reps to maintain joint integrity. Pt left seated EOB with sitters present and all needs met.

## 2019-12-13 NOTE — PLAN OF CARE
RUDI [facility] with the [facility].  Patient is 30% service connected and would not qualify to go into one [facility] the [facility].  Jess stated [facility] said he would have to do a long term medicaid application to be placed in a local facility.  RUDI asked Jess if the [facility] would cover a short term SNF stay at a local facility.  Jess will have to talk with the UR nurse again and find out if the [facility] would cover the stay.  Awaiting a call back from Jess

## 2019-12-13 NOTE — PT/OT/SLP PROGRESS
Physical Therapy  Treatment    Nicolás Marin   MRN: [id number]   Admitting Diagnosis: Acute on chronic respiratory failure with hypoxia    PT Received On: 12/13/19  PT Start Time: 1110     PT Stop Time: 1125    PT Total Time (min): 15 min       Billable Minutes:  Therapeutic Activity 15    Treatment Type: Treatment  PT/PTA: PT          General Precautions: Standard, fall, respiratory  Orthopedic Precautions: N/A   Braces: N/A    Subjective:  Communicated with NURSE prior to session.  PT 1:1 WITH STAFF  Pain/Comfort  Pain Rating 1: 0/10    Objective:   Patient found with: peripheral IV, telemetry, oxygen(VAPOTHERM)    Functional Mobility:  Therapeutic Activities and Exercises:  PT FOUND SUPINE IN BED UPON ARRIVAL, SUP<>SIT WITH SBA, SEATED SCOOT TO EOB WITH SBA, PT EDUCATED IN AND PERFORMED BLE THEREX X 15 REPS AROM WITH REST, SIT<>STAND WITH DUSTIN, PT RETURN TO SUPINE WITH SBA, ALL NEEDS MET    AM-PAC 6 CLICK MOBILITY  How much help from another person does this patient currently need?   1 = Unable, Total/Dependent Assistance  2 = A lot, Maximum/Moderate Assistance  3 = A little, Minimum/Contact Guard/Supervision  4 = None, Modified [place]/Independent    Turning over in bed (including adjusting bedclothes, sheets and blankets)?: 4  Sitting down on and standing up from a chair with arms (e.g., wheelchair, bedside commode, etc.): 3  Moving from lying on back to sitting on the side of the bed?: 4  Moving to and from a bed to a chair (including a wheelchair)?: 3  Need to walk in hospital room?: 3  Climbing 3-5 steps with a railing?: 1  Basic Mobility Total Score: 18    AM-PAC Raw Score CMS G-Code Modifier Level of Impairment Assistance   6 % Total / Unable   7 - 9 CM 80 - 100% Maximal Assist   10 - 14 CL 60 - 80% Moderate Assist   15 - 19 CK 40 - 60% Moderate Assist   20 - 22 CJ 20 - 40% Minimal Assist   23 CI 1-20% SBA / CGA   24 CH 0% Independent/ Mod I     Patient left supine with all lines intact, call button  in reach, NURSE notified and 1:1 STAFF present.    Assessment:  Nicolás Marin is a 79 y.o. male with a medical diagnosis of Acute on chronic respiratory failure with hypoxia and presents with IMPAIRED FUNCTIONAL MOBILITY. PT WILL BENEFIT FROM CONT. SKILLED P.T. TO ADDRESS IMPAIRMENTS    Rehab identified problem list/impairments: Rehab identified problem list/impairments: weakness, impaired endurance, impaired functional mobilty, gait instability, impaired balance, decreased coordination, decreased safety awareness    Rehab potential is good.    Activity tolerance: Good    Discharge recommendations: Discharge Facility/Level of Care Needs: home health PT(TBD WITH PROGRESS)     Barriers to discharge:      Equipment recommendations: Equipment Needed After Discharge: none     GOALS:   Multidisciplinary Problems     Physical Therapy Goals        Problem: Physical Therapy Goal    Goal Priority Disciplines Outcome Goal Variances Interventions   Physical Therapy Goal     PT, PT/OT Ongoing, Progressing     Description:  PT WILL BE SEEN FOR P.T. FOR A MIN OF 5 OUT OF 7 DAYS A WEEK  LT19  1. PT WILL COMPLETE BED MOBILITY MITCH  2. PT WILL T/F TO CHAIR WITH RW AND MITCH  3. PT WILL GT TRAIN X 15' WITH RW AND CGA  4. PT WILL COMPLETE B LE TE X 20 REPS                    PLAN:    Patient to be seen 5 x/week  to address the above listed problems via gait training, therapeutic activities, therapeutic exercises  Plan of Care expires: 19  Plan of Care reviewed with: patient    PT ENCOURAGED TO CALL FOR ASSISTANCE WITH ALL NEEDS DUE TO FALL RISK STATUS, PT AGREEABLE    Katharine Smith, PT  2019

## 2019-12-13 NOTE — ASSESSMENT & PLAN NOTE
Admit to Telemetry   Pt on home O 2 at 2-3 L   Venti mask at 50 %, wean as tolerated  Nebs   Probable related to CHF and plural effusions   Lasix 40 mg IV BID      12/10:  Breathing treatments  Transition from solumedrol to PO prednisone  Azithromycin   Wean O2 as tolerated     12/11:  Cont breathing treatments and steroids  Azithromycin  Cont to wean fiO2 as tolerated    12/12:  Decreased breathing txt to q6h awake   Completed azithromycin  Currently down to fiO2 of 30%  Will continue to wean   Pt on 3L at home    12/13:  Currently down to 25L FiO2 30%  Cont to wean

## 2019-12-13 NOTE — PROGRESS NOTES
[facility] Hospital Medicine  Progress Note    Patient Name: Nicolás Marin  MRN: [id number]  Patient Class: IP- Inpatient   Admission Date: 12/8/2019  Length of Stay: 4 days  Attending Physician: Sanchez Joseph MD  Primary Care Provider: Ylui Lau MD        Subjective:     Principal Problem:Acute on chronic respiratory failure with hypoxia        HPI:  Mr Marin is a 79 year old male with PMHx of COPD on home O 2, HTN, A Fib on Pradaxa who presented to [facility] with complaints of progressive shortness of breath that has continue to worsen.  Denies associated symptoms chest pain, shortness of breath, fever, chills, nausea or vomiting. [facility] and is a poor historian. EKG in the Emergency Room showed A Fib with RVR rate in of 135, he was started of Cardizem drip in the Emergency Room. CT of chest showed bilateral plural effusions, Rt greater that left. . Troponin negative. He is a full code, granddaughter Katharine, is surrogate decision make. [facility]      Overview/Hospital Course:  12/10: stable on vapotherm, continue to wean as tolerated   12/11-12/13: weaning vapotherm, placement pending    Interval History: No acute events overnight. Tolerating diet.     Review of Systems   Constitutional: Negative for fatigue and fever.   Respiratory: Negative for shortness of breath.    Cardiovascular: Negative for chest pain.   Gastrointestinal: Negative for nausea and vomiting.   Skin: Negative for rash.     Objective:     Vital Signs (Most Recent):  Temp: 98 °F (36.7 °C) (12/13/19 0432)  Pulse: 102 (12/13/19 0718)  Resp: 18 (12/13/19 0718)  BP: (!) 175/90 (12/13/19 0432)  SpO2: (!) 92 % (12/13/19 0718) Vital Signs (24h Range):  Temp:  [97.6 °F (36.4 °C)-98.6 °F (37 °C)] 98 °F (36.7 °C)  Pulse:  [] 102  Resp:  [17-20] 18  SpO2:  [90 %-98 %] 92 %  BP: (123-175)/(62-90) 175/90     Weight: 94.1 kg (207 lb 7.3 oz)  Body  mass index is 27.37 kg/m².    Intake/Output Summary (Last 24 hours) at 12/13/2019 1003  Last data filed at 12/13/2019 0400  Gross per 24 hour   Intake 840 ml   Output 1850 ml   Net -1010 ml      Physical Exam   Constitutional: He is oriented to person, place, and time. He appears well-developed and well-nourished. No distress.   Cardiovascular: Normal rate, regular rhythm and normal heart sounds. Exam reveals no gallop and no friction rub.   No murmur heard.  Pulmonary/Chest: Effort normal. No stridor. No respiratory distress. He has no wheezes. He has no rales.   Abdominal: Soft. Bowel sounds are normal. He exhibits no distension and no mass. There is no tenderness. There is no guarding.   Musculoskeletal: He exhibits no edema.   Neurological: He is alert and oriented to person, place, and time.   Skin: Skin is warm. No rash noted. He is not diaphoretic.       Significant Labs:   Recent Lab Results       12/13/19  0526        Anion Gap 9     Baso # 0.00     Basophil% 0.0     BUN, Bld 23     Calcium 8.7     Chloride 92     CO2 42  Comment:  CO2 critical result(s) called and verbal readback obtained from   DAVE LEDEZMA LPN by SHANNAN 12/13/2019 06:14       Creatinine 0.7     Differential Method Automated     eGFR if  >60     eGFR if non  >60  Comment:  Calculation used to obtain the estimated glomerular filtration  rate (eGFR) is the CKD-EPI equation.        Eos # 0.0     Eosinophil% 0.2     Glucose 96     Gran # (ANC) 5.6     Gran% 68.0     Hematocrit 36.8     Hemoglobin 10.8     Immature Grans (Abs) 0.04  Comment:  Mild elevation in immature granulocytes is non specific and   can be seen in a variety of conditions including stress response,   acute inflammation, trauma and pregnancy. Correlation with other   laboratory and clinical findings is essential.       Immature Granulocytes 0.5     Lymph # 1.4     Lymph% 16.5     MCH 30.1     MCHC 29.3          Mono # 1.2     Mono%  14.8     MPV 10.3     nRBC 0     Platelets 184     Potassium 3.9     RBC 3.59     RDW 13.2     Sodium 143     WBC 8.24         All pertinent labs within the past 24 hours have been reviewed.    Significant Imaging: I have reviewed all pertinent imaging results/findings within the past 24 hours.      Assessment/Plan:      * Acute on chronic respiratory failure with hypoxia  Admit to Telemetry   Pt on home O 2 at 2-3 L   Venti mask at 50 %, wean as tolerated  Nebs   Probable related to CHF and plural effusions   Lasix 40 mg IV BID      12/10:  Breathing treatments  Transition from solumedrol to PO prednisone  Azithromycin   Wean O2 as tolerated     12/11:  Cont breathing treatments and steroids  Azithromycin  Cont to wean fiO2 as tolerated    12/12:  Decreased breathing txt to q6h awake   Completed azithromycin  Currently down to fiO2 of 30%  Will continue to wean   Pt on 3L at home    12/13:  Currently down to 25L FiO2 30%  Cont to wean       Weakness  12/12:  Awaiting placement for SNF    12/13:  Awaiting placement       Pleural effusion, bilateral  CT shows bilateral plural effusion, more right than left   Lasix 40 mg IV BID       HLD (hyperlipidemia)  Continue Statin       Essential hypertension  Continue B blocker     12/10:  BP controlled     12/11:  BP controlledc     12/12:  BP elevated this am however it has been controlled most of admission  Cont current management, cont to monitor     Acute on chronic congestive heart failure  Strict I & O   Daily weights  Fluid restriction   Low sodium diet   Check 2 D Echo     12/10:  Continue diuresis with lasix bid  Cont to monitor   Reviewed echo          Atrial fibrillation with RVR  Admitted with A Fib with RVR   Cardizem drip   Continue B blocker   Cardiology Consult     12/10:  cardizem drip dc and transitioned to PO cardizem  Cont to monitor HR   pradaxa bid    12/11:  Rate controlled  Continue cardizem  PO pradaxa bid           VTE Risk Mitigation (From  admission, onward)         Ordered     dabigatran etexilate capsule 150 mg  2 times daily      12/09/19 0341                      Sanchez Joseph MD  Department of Hospital Medicine   [facility]

## 2019-12-13 NOTE — SUBJECTIVE & OBJECTIVE
Interval History: No acute events overnight. Tolerating diet.     Review of Systems   Constitutional: Negative for fatigue and fever.   Respiratory: Negative for shortness of breath.    Cardiovascular: Negative for chest pain.   Gastrointestinal: Negative for nausea and vomiting.   Skin: Negative for rash.     Objective:     Vital Signs (Most Recent):  Temp: 98 °F (36.7 °C) (12/13/19 0432)  Pulse: 102 (12/13/19 0718)  Resp: 18 (12/13/19 0718)  BP: (!) 175/90 (12/13/19 0432)  SpO2: (!) 92 % (12/13/19 0718) Vital Signs (24h Range):  Temp:  [97.6 °F (36.4 °C)-98.6 °F (37 °C)] 98 °F (36.7 °C)  Pulse:  [] 102  Resp:  [17-20] 18  SpO2:  [90 %-98 %] 92 %  BP: (123-175)/(62-90) 175/90     Weight: 94.1 kg (207 lb 7.3 oz)  Body mass index is 27.37 kg/m².    Intake/Output Summary (Last 24 hours) at 12/13/2019 1003  Last data filed at 12/13/2019 0400  Gross per 24 hour   Intake 840 ml   Output 1850 ml   Net -1010 ml      Physical Exam   Constitutional: He is oriented to person, place, and time. He appears well-developed and well-nourished. No distress.   Cardiovascular: Normal rate, regular rhythm and normal heart sounds. Exam reveals no gallop and no friction rub.   No murmur heard.  Pulmonary/Chest: Effort normal. No stridor. No respiratory distress. He has no wheezes. He has no rales.   Abdominal: Soft. Bowel sounds are normal. He exhibits no distension and no mass. There is no tenderness. There is no guarding.   Musculoskeletal: He exhibits no edema.   Neurological: He is alert and oriented to person, place, and time.   Skin: Skin is warm. No rash noted. He is not diaphoretic.       Significant Labs:   Recent Lab Results       12/13/19  0526        Anion Gap 9     Baso # 0.00     Basophil% 0.0     BUN, Bld 23     Calcium 8.7     Chloride 92     CO2 42  Comment:  CO2 critical result(s) called and verbal readback obtained from   DAVE LEDEZMA LPN by SHANNAN 12/13/2019 06:14       Creatinine 0.7     Differential Method  Automated     eGFR if  >60     eGFR if non  >60  Comment:  Calculation used to obtain the estimated glomerular filtration  rate (eGFR) is the CKD-EPI equation.        Eos # 0.0     Eosinophil% 0.2     Glucose 96     Gran # (ANC) 5.6     Gran% 68.0     Hematocrit 36.8     Hemoglobin 10.8     Immature Grans (Abs) 0.04  Comment:  Mild elevation in immature granulocytes is non specific and   can be seen in a variety of conditions including stress response,   acute inflammation, trauma and pregnancy. Correlation with other   laboratory and clinical findings is essential.       Immature Granulocytes 0.5     Lymph # 1.4     Lymph% 16.5     MCH 30.1     MCHC 29.3          Mono # 1.2     Mono% 14.8     MPV 10.3     nRBC 0     Platelets 184     Potassium 3.9     RBC 3.59     RDW 13.2     Sodium 143     WBC 8.24         All pertinent labs within the past 24 hours have been reviewed.    Significant Imaging: I have reviewed all pertinent imaging results/findings within the past 24 hours.

## 2019-12-13 NOTE — PLAN OF CARE
Ongoing (interventions implemented as appropriate)  Pt AAO x4.  VSS  Pt able to make needs known.  Pt remained afebrile throughout this shift.   Pt ambulates in room, gait unsteady   Pt remained free of falls this shift.   Pt denies pain this shift.  Plan of care reviewed. Patient verbalizes understanding.   Pt moving/turing independent. Frequent weight shifting encouraged.  Patient AFIB on monitor.   Bed low, side rails up x 2, wheels locked, call light in reach.   Hourly rounding completed.   Will continue to monitor.

## 2019-12-13 NOTE — PLAN OF CARE
[facility].  [facility].  She is unsure of bed availability.  Jess will email RUDI a worksheet that needs to be completed.  Awaiting email

## 2019-12-13 NOTE — PT/OT/SLP PROGRESS
Occupational Therapy  Treatment    Nicolás Marin   MRN: [id number]   Admitting Diagnosis: Acute on chronic respiratory failure with hypoxia    OT Date of Treatment: 12/13/19   OT Start Time: 1000  OT Stop Time: 1025  OT Total Time (min): 25 min    Billable Minutes:  Self Care/Home Management 15 and Therapeutic Exercise 10    General Precautions: Standard, fall, respiratory  Orthopedic Precautions: N/A  Braces: N/A         Subjective:  Communicated with NURSE SORIANO prior to session.    Pain/Comfort  Pain Rating 1: 0/10  Pain Rating Post-Intervention 1: 0/10    Objective:  Patient found with: peripheral IV, telemetry, oxygen(vapotherm)     Balance:   Static Sit: GOOD: Takes MODERATE challenges from all directions  Dynamic Sit: GOOD-: Incosistently Maintains balance through MODERATE excursions of active trunk movement,     Static Stand: POOR+: Needs MINIMAL assist to maintain  Dynamic stand: POOR+: Needs MIN (minimal ) assist during gait    Therapeutic Activities and Exercises:  Pt met in room with sitters present and vapotherm donned. Pt performed bed mobility SBA. Stood with min a (HHA) and took ~4 steps to sink, where he stood and performed grooming/dentures care with CGA. Pt walked back to bed 4 feet with min A and left seated EOB, completing BUE arom 2x10 reps to maintain joint integrity. Pt left seated EOB with sitters present and all needs met.    AM-PAC 6 CLICK ADL   How much help from another person does this patient currently need?   1 = Unable, Total/Dependent Assistance  2 = A lot, Maximum/Moderate Assistance  3 = A little, Minimum/Contact Guard/Supervision  4 = None, Modified [place]/Independent    Putting on and taking off regular lower body clothing? : 3  Bathing (including washing, rinsing, drying)?: 3  Toileting, which includes using toilet, bedpan, or urinal? : 3  Putting on and taking off regular upper body clothing?: 3  Taking care of personal grooming such as brushing teeth?: 4  Eating meals?:  "4  Daily Activity Total Score: 20     AM-PAC Raw Score CMS "G-Code Modifier Level of Impairment Assistance   6 % Total / Unable   7 - 8 CM 80 - 100% Maximal Assist   9-13 CL 60 - 80% Moderate Assist   14 - 19 CK 40 - 60% Moderate Assist   20 - 22 CJ 20 - 40% Minimal Assist   23 CI 1-20% SBA / CGA   24 CH 0% Independent/ Mod I       Patient left SEATED EOB with all lines intact, call button in reach and SITTERS present    ASSESSMENT:  Nicolás Marin is a 79 y.o. male with a medical diagnosis of Acute on chronic respiratory failure with hypoxia and presents with DEBILITY, IMPAIRED ADLS, FUNCTIONAL MOBILITY AND SAFETY AWARENESS. PT WILL BENEFIT FROM CONTINUED SKILLED OT SERVICES TO INCREASE FUNCTIONAL INDEPENDENCE AND SAFETY.    Rehab identified problem list/impairments: Rehab identified problem list/impairments: weakness, impaired endurance, gait instability, impaired functional mobilty, impaired self care skills, impaired balance, decreased upper extremity function, impaired cognition, decreased lower extremity function, decreased coordination, decreased safety awareness, impaired cardiopulmonary response to activity, impaired coordination    Rehab potential is good.    Activity tolerance: Good    Discharge recommendations: Discharge Facility/Level of Care Needs: home health OT     Barriers to discharge: Barriers to Discharge: Decreased caregiver support    Equipment recommendations: none     GOALS:   Multidisciplinary Problems     Occupational Therapy Goals        Problem: Occupational Therapy Goal    Goal Priority Disciplines Outcome Interventions   Occupational Therapy Goal     OT, PT/OT Ongoing, Progressing    Description:  ot goals to be met by 12-17-19  sba with ue dressing  sba with le dressing  Pt will tolerate 1 set x 15 reps b ue rom exercise  sba with bsc/ toilet  t/f's                    Plan:  Patient to be seen 3 x/week to address the above listed problems via self-care/home management, therapeutic " activities, therapeutic exercises  Plan of Care expires: 12/17/19  Plan of Care reviewed with: patient    OT G-codes  Functional Assessment Tool Used: [facility]-PAC  Score: 20  Functional Limitation: Self care    Jen Randolph OT  12/13/2019

## 2019-12-13 NOTE — PLAN OF CARE
Patient in room awake alert and confused, disoriented to time place and situation.   Frequent reorientation provided per staff.   Speech clear, able to verbalize needs and concerns.  Patient denies pain and discomfort.  Patient skin intact, oral mucus membranes pink and moist.  Brown discoloration and edema present to BLE. Capillary Refill assessed.   Patient is ambulatory, unsteady gait noted.   Sitter at bedside noted.   Patient able to position/reposition self in bed.  Frequent weight shifts  encouraged.  Educated on fall precaution and how to utilize call light.  Non-skids noted to bilateral lower extremities.  Room free from clutter, floor is dry.  Plan of care reviewed, no evidence of learning.   Bed in low position, side rails up x 2, call light in reach.  Will continue to monitor

## 2019-12-14 LAB
BACTERIA BLD CULT: NORMAL
BACTERIA BLD CULT: NORMAL

## 2019-12-14 PROCEDURE — 97116 GAIT TRAINING THERAPY: CPT

## 2019-12-14 PROCEDURE — 25000242 PHARM REV CODE 250 ALT 637 W/ HCPCS: Performed by: NURSE PRACTITIONER

## 2019-12-14 PROCEDURE — 25000003 PHARM REV CODE 250: Performed by: NURSE PRACTITIONER

## 2019-12-14 PROCEDURE — 27100092 HC HIGH FLOW DELIVERY CANNULA

## 2019-12-14 PROCEDURE — 25000003 PHARM REV CODE 250: Performed by: PHYSICIAN ASSISTANT

## 2019-12-14 PROCEDURE — 21400001 HC TELEMETRY ROOM

## 2019-12-14 PROCEDURE — 27000221 HC OXYGEN, UP TO 24 HOURS

## 2019-12-14 PROCEDURE — 25000242 PHARM REV CODE 250 ALT 637 W/ HCPCS: Performed by: FAMILY MEDICINE

## 2019-12-14 PROCEDURE — 63600175 PHARM REV CODE 636 W HCPCS: Performed by: FAMILY MEDICINE

## 2019-12-14 PROCEDURE — 27100171 HC OXYGEN HIGH FLOW UP TO 24 HOURS

## 2019-12-14 PROCEDURE — 94640 AIRWAY INHALATION TREATMENT: CPT

## 2019-12-14 PROCEDURE — 63600175 PHARM REV CODE 636 W HCPCS: Performed by: NURSE PRACTITIONER

## 2019-12-14 RX ADMIN — DABIGATRAN ETEXILATE MESYLATE 150 MG: 150 CAPSULE ORAL at 08:12

## 2019-12-14 RX ADMIN — BUDESONIDE 0.5 MG: 0.5 INHALANT RESPIRATORY (INHALATION) at 07:12

## 2019-12-14 RX ADMIN — DABIGATRAN ETEXILATE MESYLATE 150 MG: 150 CAPSULE ORAL at 09:12

## 2019-12-14 RX ADMIN — ATORVASTATIN CALCIUM 40 MG: 40 TABLET, FILM COATED ORAL at 09:12

## 2019-12-14 RX ADMIN — PREDNISONE 20 MG: 20 TABLET ORAL at 09:12

## 2019-12-14 RX ADMIN — FUROSEMIDE 40 MG: 10 INJECTION, SOLUTION INTRAMUSCULAR; INTRAVENOUS at 09:12

## 2019-12-14 RX ADMIN — FINASTERIDE 5 MG: 5 TABLET, FILM COATED ORAL at 09:12

## 2019-12-14 RX ADMIN — THERA TABS 1 TABLET: TAB at 09:12

## 2019-12-14 RX ADMIN — PANTOPRAZOLE SODIUM 40 MG: 40 TABLET, DELAYED RELEASE ORAL at 09:12

## 2019-12-14 RX ADMIN — METOPROLOL SUCCINATE 100 MG: 50 TABLET, FILM COATED, EXTENDED RELEASE ORAL at 09:12

## 2019-12-14 RX ADMIN — IPRATROPIUM BROMIDE AND ALBUTEROL SULFATE 3 ML: .5; 3 SOLUTION RESPIRATORY (INHALATION) at 07:12

## 2019-12-14 RX ADMIN — IPRATROPIUM BROMIDE AND ALBUTEROL SULFATE 3 ML: .5; 3 SOLUTION RESPIRATORY (INHALATION) at 01:12

## 2019-12-14 RX ADMIN — FUROSEMIDE 40 MG: 10 INJECTION, SOLUTION INTRAMUSCULAR; INTRAVENOUS at 06:12

## 2019-12-14 RX ADMIN — DILTIAZEM HYDROCHLORIDE 180 MG: 180 CAPSULE, COATED, EXTENDED RELEASE ORAL at 09:12

## 2019-12-14 RX ADMIN — TAMSULOSIN HYDROCHLORIDE 0.4 MG: 0.4 CAPSULE ORAL at 09:12

## 2019-12-14 NOTE — ASSESSMENT & PLAN NOTE
Admit to Telemetry   Pt on home O 2 at 2-3 L   Venti mask at 50 %, wean as tolerated  Nebs   Probable related to CHF and plural effusions   Lasix 40 mg IV BID      12/10:  Breathing treatments  Transition from solumedrol to PO prednisone  Azithromycin   Wean O2 as tolerated     12/11:  Cont breathing treatments and steroids  Azithromycin  Cont to wean fiO2 as tolerated    12/12:  Decreased breathing txt to q6h awake   Completed azithromycin  Currently down to fiO2 of 30%  Will continue to wean   Pt on 3L at home    12/13:  Currently down to 25L FiO2 30%  Cont to wean     12/14:  Currently on 20L and FiO2 of 30%  Continue to wean down to NC  On 3L at home   Goal spO2 88-92

## 2019-12-14 NOTE — PROGRESS NOTES
[facility] Hospital Medicine  Progress Note    Patient Name: Nicolás Marin  MRN: [id number]  Patient Class: IP- Inpatient   Admission Date: 12/8/2019  Length of Stay: 5 days  Attending Physician: Sanchez Joseph MD  Primary Care Provider: Yuli Lau MD        Subjective:     Principal Problem:Acute on chronic respiratory failure with hypoxia        HPI:  Mr Marin is a 79 year old male with PMHx of COPD on home O 2, HTN, A Fib on Pradaxa who presented to [facility] with complaints of progressive shortness of breath that has continue to worsen.  Denies associated symptoms chest pain, shortness of breath, fever, chills, nausea or vomiting. [facility] and is a poor historian. EKG in the Emergency Room showed A Fib with RVR rate in of 135, he was started of Cardizem drip in the Emergency Room. CT of chest showed bilateral plural effusions, Rt greater that left. . Troponin negative. He is a full code, granddaughter Katharine, is surrogate decision make. [facility] care of [facility].      Overview/Hospital Course:  12/10: stable on vapotherm, continue to wean as tolerated   12/11-12/13: weaning vapotherm, placement pending  12/14: pt improving can possibly go home with PT/OT. Cont to wean O2    Interval History: No acute events reported overnight.     Review of Systems   Constitutional: Negative for fatigue and fever.   Respiratory: Negative for shortness of breath.    Cardiovascular: Negative for chest pain.   Gastrointestinal: Negative for nausea and vomiting.   Skin: Negative for rash.     Objective:     Vital Signs (Most Recent):  Temp: 97.6 °F (36.4 °C) (12/14/19 0753)  Pulse: 76 (12/14/19 0753)  Resp: 20 (12/14/19 0753)  BP: (!) 146/82 (12/14/19 0753)  SpO2: 95 % (12/14/19 0753) Vital Signs (24h Range):  Temp:  [97.4 °F (36.3 °C)-98.1 °F (36.7 °C)] 97.6 °F (36.4 °C)  Pulse:  [73-99] 76  Resp:  [18-21] 20  SpO2:  [90 %-97 %] 95 %  BP:  (102-153)/(62-84) 146/82     Weight: 95.6 kg (210 lb 12.2 oz)  Body mass index is 27.81 kg/m².    Intake/Output Summary (Last 24 hours) at 12/14/2019 1034  Last data filed at 12/14/2019 0500  Gross per 24 hour   Intake 1130 ml   Output 2250 ml   Net -1120 ml      Physical Exam   Constitutional: He is oriented to person, place, and time. He appears well-developed and well-nourished. No distress.   Cardiovascular: Normal rate, regular rhythm and normal heart sounds. Exam reveals no gallop and no friction rub.   No murmur heard.  Pulmonary/Chest: Effort normal. No stridor. No respiratory distress. He has no wheezes. He has no rales.   Abdominal: Soft. Bowel sounds are normal. He exhibits no distension and no mass. There is no tenderness. There is no guarding.   Musculoskeletal: He exhibits no edema.   Neurological: He is alert and oriented to person, place, and time.   Skin: Skin is warm. No rash noted. He is not diaphoretic.       Significant Labs:   Recent Lab Results     None        All pertinent labs within the past 24 hours have been reviewed.    Significant Imaging: I have reviewed all pertinent imaging results/findings within the past 24 hours.      Assessment/Plan:      * Acute on chronic respiratory failure with hypoxia  Admit to Telemetry   Pt on home O 2 at 2-3 L   Venti mask at 50 %, wean as tolerated  Nebs   Probable related to CHF and plural effusions   Lasix 40 mg IV BID      12/10:  Breathing treatments  Transition from solumedrol to PO prednisone  Azithromycin   Wean O2 as tolerated     12/11:  Cont breathing treatments and steroids  Azithromycin  Cont to wean fiO2 as tolerated    12/12:  Decreased breathing txt to q6h awake   Completed azithromycin  Currently down to fiO2 of 30%  Will continue to wean   Pt on 3L at home    12/13:  Currently down to 25L FiO2 30%  Cont to wean     12/14:  Currently on 20L and FiO2 of 30%  Continue to wean down to NC  On 3L at home   Goal spO2  88-92    Weakness  12/12:  Awaiting placement for SNF    12/13:  Awaiting placement     12/14:  PT/OT possible dc home with home health and PT/OT  Will discuss with case management  Possible dc home as opposed to SNF    Pleural effusion, bilateral  CT shows bilateral plural effusion, more right than left   Lasix 40 mg IV BID       HLD (hyperlipidemia)  Continue Statin       Essential hypertension  Continue B blocker     12/10:  BP controlled     12/11:  BP controlledc     12/12:  BP elevated this am however it has been controlled most of admission  Cont current management, cont to monitor     Acute on chronic congestive heart failure  Strict I & O   Daily weights  Fluid restriction   Low sodium diet   Check 2 D Echo     12/10:  Continue diuresis with lasix bid  Cont to monitor   Reviewed echo          Atrial fibrillation with RVR  Admitted with A Fib with RVR   Cardizem drip   Continue B blocker   Cardiology Consult     12/10:  cardizem drip dc and transitioned to PO cardizem  Cont to monitor HR   pradaxa bid    12/11:  Rate controlled  Continue cardizem  PO pradaxa bid           VTE Risk Mitigation (From admission, onward)         Ordered     dabigatran etexilate capsule 150 mg  2 times daily      12/09/19 0341                      Sanchez Joseph MD  Department of Hospital Medicine   [facility]

## 2019-12-14 NOTE — PT/OT/SLP PROGRESS
Physical Therapy  Treatment    Nicolás Marin   MRN: [id number]   Admitting Diagnosis: Acute on chronic respiratory failure with hypoxia       PT Start Time: 0940     PT Stop Time: 1005    PT Total Time (min): 25 min       Billable Minutes:  Gait Training 25    Treatment Type: Treatment  PT/PTA: PTA     PTA Visit Number: 1       General Precautions: Standard, fall  Orthopedic Precautions: N/A   Braces:           Subjective:  Communicated with NSG prior to session.      Pain/Comfort  Pain Rating 1: 0/10  Pain Rating Post-Intervention 1: 0/10    Objective:        Functional Mobility:  Bed Mobility:        Transfers:       Gait:        Stairs:      Gait:     Balance:   Static Sit: FAIR+: Able to take MINIMAL challenges from all directions  Dynamic Sit: FAIR+: Maintains balance through MINIMAL excursions of active trunk motion  Static Stand: FAIR: Maintains without assist but unable to take challenges  Dynamic stand: FAIR: Needs CONTACT GUARD during gait     Therapeutic Activities and Exercises:  ALL BED MOB AND T/F'S SBA  AMBULATED IN ROOM WITH 20L O2 CG 2X40'      AM-PAC 6 CLICK MOBILITY  How much help from another person does this patient currently need?   1 = Unable, Total/Dependent Assistance  2 = A lot, Maximum/Moderate Assistance  3 = A little, Minimum/Contact Guard/Supervision  4 = None, Modified [place]/Independent         AM-PAC Raw Score CMS G-Code Modifier Level of Impairment Assistance   6 % Total / Unable   7 - 9 CM 80 - 100% Maximal Assist   10 - 14 CL 60 - 80% Moderate Assist   15 - 19 CK 40 - 60% Moderate Assist   20 - 22 CJ 20 - 40% Minimal Assist   23 CI 1-20% SBA / CGA   24 CH 0% Independent/ Mod I     Patient left supine with all lines intact, call button in reach and bed alarm on.    Assessment:  Nicolás Marin is a 79 y.o. male with a medical diagnosis of Acute on chronic respiratory failure with hypoxia and presents with .    Rehab identified problem list/impairments: Rehab identified problem  list/impairments: weakness, impaired self care skills, impaired functional mobilty, impaired endurance, decreased lower extremity function    Rehab potential is fair.    Activity tolerance: Fair    Discharge recommendations:       Barriers to discharge:      Equipment recommendations: Equipment Needed After Discharge: oxygen, walker, rolling     GOALS:   Multidisciplinary Problems     Physical Therapy Goals        Problem: Physical Therapy Goal    Goal Priority Disciplines Outcome Goal Variances Interventions   Physical Therapy Goal     PT, PT/OT Ongoing, Progressing     Description:  PT WILL BE SEEN FOR P.T. FOR A MIN OF 5 OUT OF 7 DAYS A WEEK  LT19  1. PT WILL COMPLETE BED MOBILITY MITCH  2. PT WILL T/F TO CHAIR WITH RW AND MITCH  3. PT WILL GT TRAIN X 15' WITH RW AND CGA  4. PT WILL COMPLETE B LE TE X 20 REPS                    PLAN:    Patient to be seen 5 x/week  to address the above listed problems via gait training, therapeutic activities, therapeutic exercises  Plan of Care expires: 19  Plan of Care reviewed with: patient         Morgan Mcmahanlo, PTA  2019

## 2019-12-14 NOTE — PLAN OF CARE
Pt without fall.  Sitter at bedside.  A-fib on monitor, rate controlled.  Vapotherm @ 25L.  POC reviewed with patient.  Will continue monitoring.

## 2019-12-14 NOTE — SUBJECTIVE & OBJECTIVE
Interval History: No acute events reported overnight.     Review of Systems   Constitutional: Negative for fatigue and fever.   Respiratory: Negative for shortness of breath.    Cardiovascular: Negative for chest pain.   Gastrointestinal: Negative for nausea and vomiting.   Skin: Negative for rash.     Objective:     Vital Signs (Most Recent):  Temp: 97.6 °F (36.4 °C) (12/14/19 0753)  Pulse: 76 (12/14/19 0753)  Resp: 20 (12/14/19 0753)  BP: (!) 146/82 (12/14/19 0753)  SpO2: 95 % (12/14/19 0753) Vital Signs (24h Range):  Temp:  [97.4 °F (36.3 °C)-98.1 °F (36.7 °C)] 97.6 °F (36.4 °C)  Pulse:  [73-99] 76  Resp:  [18-21] 20  SpO2:  [90 %-97 %] 95 %  BP: (102-153)/(62-84) 146/82     Weight: 95.6 kg (210 lb 12.2 oz)  Body mass index is 27.81 kg/m².    Intake/Output Summary (Last 24 hours) at 12/14/2019 1034  Last data filed at 12/14/2019 0500  Gross per 24 hour   Intake 1130 ml   Output 2250 ml   Net -1120 ml      Physical Exam   Constitutional: He is oriented to person, place, and time. He appears well-developed and well-nourished. No distress.   Cardiovascular: Normal rate, regular rhythm and normal heart sounds. Exam reveals no gallop and no friction rub.   No murmur heard.  Pulmonary/Chest: Effort normal. No stridor. No respiratory distress. He has no wheezes. He has no rales.   Abdominal: Soft. Bowel sounds are normal. He exhibits no distension and no mass. There is no tenderness. There is no guarding.   Musculoskeletal: He exhibits no edema.   Neurological: He is alert and oriented to person, place, and time.   Skin: Skin is warm. No rash noted. He is not diaphoretic.       Significant Labs:   Recent Lab Results     None        All pertinent labs within the past 24 hours have been reviewed.    Significant Imaging: I have reviewed all pertinent imaging results/findings within the past 24 hours.

## 2019-12-14 NOTE — PLAN OF CARE
Patient awake, alert and oriented can be confused at times.  Patient very aware of his care. He understands he requires oxygen.   Forgets to use call light for assistance.  Fall risk. Telesitter at bedside.  Patient able to make needs known.   Needs met by staff for shift.  No acute distress noted, respirations even and unlabored.   Patient denies pain at this time.   PRN pain meds administered per MAR.  IV site patent and intact, no redness.  No other complaints voiced at this time.   Continue on telemetry monitoring.

## 2019-12-14 NOTE — ASSESSMENT & PLAN NOTE
12/12:  Awaiting placement for SNF    12/13:  Awaiting placement     12/14:  PT/OT possible dc home with home health and PT/OT  Will discuss with case management  Possible dc home as opposed to SNF

## 2019-12-15 ENCOUNTER — TELEPHONE (OUTPATIENT)
Dept: CARDIOLOGY | Facility: HOSPITAL | Age: 79
End: 2019-12-15

## 2019-12-15 PROBLEM — J90 PLEURAL EFFUSION, BILATERAL: Status: RESOLVED | Noted: 2019-12-09 | Resolved: 2019-12-15

## 2019-12-15 PROBLEM — E78.5 HLD (HYPERLIPIDEMIA): Status: RESOLVED | Noted: 2019-12-09 | Resolved: 2019-12-15

## 2019-12-15 LAB
ANION GAP SERPL CALC-SCNC: 7 MMOL/L (ref 8–16)
BASOPHILS # BLD AUTO: 0.01 K/UL (ref 0–0.2)
BASOPHILS NFR BLD: 0.1 % (ref 0–1.9)
BUN SERPL-MCNC: 27 MG/DL (ref 8–23)
CALCIUM SERPL-MCNC: 9.2 MG/DL (ref 8.7–10.5)
CHLORIDE SERPL-SCNC: 92 MMOL/L (ref 95–110)
CO2 SERPL-SCNC: 43 MMOL/L (ref 23–29)
CREAT SERPL-MCNC: 0.8 MG/DL (ref 0.5–1.4)
DIFFERENTIAL METHOD: ABNORMAL
EOSINOPHIL # BLD AUTO: 0.1 K/UL (ref 0–0.5)
EOSINOPHIL NFR BLD: 0.4 % (ref 0–8)
ERYTHROCYTE [DISTWIDTH] IN BLOOD BY AUTOMATED COUNT: 13.2 % (ref 11.5–14.5)
EST. GFR  (AFRICAN AMERICAN): >60 ML/MIN/1.73 M^2
EST. GFR  (NON AFRICAN AMERICAN): >60 ML/MIN/1.73 M^2
GLUCOSE SERPL-MCNC: 101 MG/DL (ref 70–110)
HCT VFR BLD AUTO: 36.9 % (ref 40–54)
HGB BLD-MCNC: 11.2 G/DL (ref 14–18)
IMM GRANULOCYTES # BLD AUTO: 0.09 K/UL (ref 0–0.04)
IMM GRANULOCYTES NFR BLD AUTO: 0.7 % (ref 0–0.5)
LYMPHOCYTES # BLD AUTO: 1.3 K/UL (ref 1–4.8)
LYMPHOCYTES NFR BLD: 10.8 % (ref 18–48)
MCH RBC QN AUTO: 30.9 PG (ref 27–31)
MCHC RBC AUTO-ENTMCNC: 30.4 G/DL (ref 32–36)
MCV RBC AUTO: 102 FL (ref 82–98)
MONOCYTES # BLD AUTO: 1.7 K/UL (ref 0.3–1)
MONOCYTES NFR BLD: 14.1 % (ref 4–15)
NEUTROPHILS # BLD AUTO: 9 K/UL (ref 1.8–7.7)
NEUTROPHILS NFR BLD: 73.9 % (ref 38–73)
NRBC BLD-RTO: 0 /100 WBC
PLATELET # BLD AUTO: 205 K/UL (ref 150–350)
PMV BLD AUTO: 10.6 FL (ref 9.2–12.9)
POTASSIUM SERPL-SCNC: 4.1 MMOL/L (ref 3.5–5.1)
RBC # BLD AUTO: 3.62 M/UL (ref 4.6–6.2)
SODIUM SERPL-SCNC: 142 MMOL/L (ref 136–145)
WBC # BLD AUTO: 12.18 K/UL (ref 3.9–12.7)

## 2019-12-15 PROCEDURE — 25000003 PHARM REV CODE 250: Performed by: NURSE PRACTITIONER

## 2019-12-15 PROCEDURE — 97116 GAIT TRAINING THERAPY: CPT

## 2019-12-15 PROCEDURE — 85025 COMPLETE CBC W/AUTO DIFF WBC: CPT

## 2019-12-15 PROCEDURE — 27100092 HC HIGH FLOW DELIVERY CANNULA

## 2019-12-15 PROCEDURE — 25000242 PHARM REV CODE 250 ALT 637 W/ HCPCS: Performed by: FAMILY MEDICINE

## 2019-12-15 PROCEDURE — 94640 AIRWAY INHALATION TREATMENT: CPT

## 2019-12-15 PROCEDURE — 63600175 PHARM REV CODE 636 W HCPCS: Performed by: NURSE PRACTITIONER

## 2019-12-15 PROCEDURE — 63600175 PHARM REV CODE 636 W HCPCS: Performed by: FAMILY MEDICINE

## 2019-12-15 PROCEDURE — 25000003 PHARM REV CODE 250: Performed by: PHYSICIAN ASSISTANT

## 2019-12-15 PROCEDURE — 25000242 PHARM REV CODE 250 ALT 637 W/ HCPCS: Performed by: NURSE PRACTITIONER

## 2019-12-15 PROCEDURE — 36415 COLL VENOUS BLD VENIPUNCTURE: CPT

## 2019-12-15 PROCEDURE — 80048 BASIC METABOLIC PNL TOTAL CA: CPT

## 2019-12-15 PROCEDURE — 21400001 HC TELEMETRY ROOM

## 2019-12-15 PROCEDURE — 94761 N-INVAS EAR/PLS OXIMETRY MLT: CPT

## 2019-12-15 PROCEDURE — 99900035 HC TECH TIME PER 15 MIN (STAT)

## 2019-12-15 PROCEDURE — 27100171 HC OXYGEN HIGH FLOW UP TO 24 HOURS

## 2019-12-15 RX ADMIN — DABIGATRAN ETEXILATE MESYLATE 150 MG: 150 CAPSULE ORAL at 09:12

## 2019-12-15 RX ADMIN — IPRATROPIUM BROMIDE AND ALBUTEROL SULFATE 3 ML: .5; 3 SOLUTION RESPIRATORY (INHALATION) at 08:12

## 2019-12-15 RX ADMIN — BUDESONIDE 0.5 MG: 0.5 INHALANT RESPIRATORY (INHALATION) at 08:12

## 2019-12-15 RX ADMIN — PREDNISONE 20 MG: 20 TABLET ORAL at 09:12

## 2019-12-15 RX ADMIN — PANTOPRAZOLE SODIUM 40 MG: 40 TABLET, DELAYED RELEASE ORAL at 09:12

## 2019-12-15 RX ADMIN — THERA TABS 1 TABLET: TAB at 09:12

## 2019-12-15 RX ADMIN — IPRATROPIUM BROMIDE AND ALBUTEROL SULFATE 3 ML: .5; 3 SOLUTION RESPIRATORY (INHALATION) at 07:12

## 2019-12-15 RX ADMIN — FUROSEMIDE 40 MG: 10 INJECTION, SOLUTION INTRAMUSCULAR; INTRAVENOUS at 06:12

## 2019-12-15 RX ADMIN — FINASTERIDE 5 MG: 5 TABLET, FILM COATED ORAL at 09:12

## 2019-12-15 RX ADMIN — ATORVASTATIN CALCIUM 40 MG: 40 TABLET, FILM COATED ORAL at 09:12

## 2019-12-15 RX ADMIN — IPRATROPIUM BROMIDE AND ALBUTEROL SULFATE 3 ML: .5; 3 SOLUTION RESPIRATORY (INHALATION) at 01:12

## 2019-12-15 RX ADMIN — DABIGATRAN ETEXILATE MESYLATE 150 MG: 150 CAPSULE ORAL at 08:12

## 2019-12-15 RX ADMIN — BUDESONIDE 0.5 MG: 0.5 INHALANT RESPIRATORY (INHALATION) at 07:12

## 2019-12-15 RX ADMIN — DILTIAZEM HYDROCHLORIDE 180 MG: 180 CAPSULE, COATED, EXTENDED RELEASE ORAL at 09:12

## 2019-12-15 RX ADMIN — METOPROLOL SUCCINATE 100 MG: 50 TABLET, FILM COATED, EXTENDED RELEASE ORAL at 09:12

## 2019-12-15 RX ADMIN — FUROSEMIDE 40 MG: 10 INJECTION, SOLUTION INTRAMUSCULAR; INTRAVENOUS at 09:12

## 2019-12-15 RX ADMIN — TAMSULOSIN HYDROCHLORIDE 0.4 MG: 0.4 CAPSULE ORAL at 09:12

## 2019-12-15 NOTE — ASSESSMENT & PLAN NOTE
12/12:  Awaiting placement for SNF    12/13:  Awaiting placement     12/14:  PT/OT possible dc home with home health and PT/OT  Will discuss with case management  Possible dc home as opposed to SNF    12/15:  Cont PT/OT  Possible DC home, SNF placement pending in case patient continues to require SNF

## 2019-12-15 NOTE — ASSESSMENT & PLAN NOTE
Admit to Telemetry   Pt on home O 2 at 2-3 L   Venti mask at 50 %, wean as tolerated  Nebs   Probable related to CHF and plural effusions   Lasix 40 mg IV BID      12/10:  Breathing treatments  Transition from solumedrol to PO prednisone  Azithromycin   Wean O2 as tolerated     12/11:  Cont breathing treatments and steroids  Azithromycin  Cont to wean fiO2 as tolerated    12/12:  Decreased breathing txt to q6h awake   Completed azithromycin  Currently down to fiO2 of 30%  Will continue to wean   Pt on 3L at home    12/13:  Currently down to 25L FiO2 30%  Cont to wean     12/14:  Currently on 20L and FiO2 of 30%  Continue to wean down to NC  On 3L at home   Goal spO2 88-92    12/15:  Continue to wean O2 as tolerated

## 2019-12-15 NOTE — PROGRESS NOTES
[facility] Hospital Medicine  Progress Note    Patient Name: Nicolás Marin  MRN: [id number]  Patient Class: IP- Inpatient   Admission Date: 12/8/2019  Length of Stay: 6 days  Attending Physician: Sanchez Joseph MD  Primary Care Provider: Yuli Lau MD        Subjective:     Principal Problem:Acute on chronic respiratory failure with hypoxia        HPI:  Mr Marin is a 79 year old male with PMHx of COPD on home O 2, HTN, A Fib on Pradaxa who presented to [facility] with complaints of progressive shortness of breath that has continue to worsen.  Denies associated symptoms chest pain, shortness of breath, fever, chills, nausea or vomiting. [facility] and is a poor historian. EKG in the Emergency Room showed A Fib with RVR rate in of 135, he was started of Cardizem drip in the Emergency Room. CT of chest showed bilateral plural effusions, Rt greater that left. . Troponin negative. He is a full code, granddaughter Katharine, is surrogate decision make. [facility].      Overview/Hospital Course:  12/10: stable on vapotherm, continue to wean as tolerated   12/11-12/13: weaning vapotherm, placement pending  12/14: pt improving can possibly go home with PT/OT. Cont to wean O2  12/15: cont to wean vapotherm, improving. Dc when back on Nasal cannula at 3L (home rate). Continue breathing treatments    Interval History: Denies any issues overnight.     Review of Systems   Constitutional: Negative for fatigue and fever.   Respiratory: Negative for shortness of breath.    Cardiovascular: Negative for chest pain.   Gastrointestinal: Negative for nausea and vomiting.   Skin: Negative for rash.     Objective:     Vital Signs (Most Recent):  Temp: 97.9 °F (36.6 °C) (12/15/19 0744)  Pulse: (!) 112 (12/15/19 1133)  Resp: 18 (12/15/19 0744)  BP: 117/60 (12/15/19 0744)  SpO2: (!) 92 % (12/15/19 0744) Vital Signs (24h Range):  Temp:  [97.2 °F  (36.2 °C)-98.5 °F (36.9 °C)] 97.9 °F (36.6 °C)  Pulse:  [] 112  Resp:  [18-20] 18  SpO2:  [88 %-97 %] 92 %  BP: (102-143)/(58-75) 117/60     Weight: 96.6 kg (212 lb 15.4 oz)  Body mass index is 28.1 kg/m².    Intake/Output Summary (Last 24 hours) at 12/15/2019 1142  Last data filed at 12/15/2019 0501  Gross per 24 hour   Intake 1041 ml   Output 3175 ml   Net -2134 ml      Physical Exam   Constitutional: He is oriented to person, place, and time. He appears well-developed and well-nourished. No distress.   Cardiovascular: Normal rate, regular rhythm and normal heart sounds. Exam reveals no gallop and no friction rub.   No murmur heard.  Pulmonary/Chest: Effort normal. No stridor. No respiratory distress. He has no wheezes. He has no rales.   Abdominal: Soft. Bowel sounds are normal. He exhibits no distension and no mass. There is no tenderness. There is no guarding.   Musculoskeletal: He exhibits no edema.   Neurological: He is alert and oriented to person, place, and time.   Skin: Skin is warm. No rash noted. He is not diaphoretic.       Significant Labs:   Recent Lab Results       12/15/19  0412        Anion Gap 7     Baso # 0.01     Basophil% 0.1     BUN, Bld 27     Calcium 9.2     Chloride 92     CO2 43  Comment:  CO2  critical result(s) called and verbal readback obtained from   Scott Domingo RN by AMR1 12/15/2019 05:45       Creatinine 0.8     Differential Method Automated     eGFR if  >60     eGFR if non  >60  Comment:  Calculation used to obtain the estimated glomerular filtration  rate (eGFR) is the CKD-EPI equation.        Eos # 0.1     Eosinophil% 0.4     Glucose 101     Gran # (ANC) 9.0     Gran% 73.9     Hematocrit 36.9     Hemoglobin 11.2     Immature Grans (Abs) 0.09  Comment:  Mild elevation in immature granulocytes is non specific and   can be seen in a variety of conditions including stress response,   acute inflammation, trauma and pregnancy. Correlation with  other   laboratory and clinical findings is essential.       Immature Granulocytes 0.7     Lymph # 1.3     Lymph% 10.8     MCH 30.9     MCHC 30.4          Mono # 1.7     Mono% 14.1     MPV 10.6     nRBC 0     Platelets 205     Potassium 4.1     RBC 3.62     RDW 13.2     Sodium 142     WBC 12.18         All pertinent labs within the past 24 hours have been reviewed.    Significant Imaging: I have reviewed all pertinent imaging results/findings within the past 24 hours.      Assessment/Plan:      * Acute on chronic respiratory failure with hypoxia  Admit to Telemetry   Pt on home O 2 at 2-3 L   Venti mask at 50 %, wean as tolerated  Nebs   Probable related to CHF and plural effusions   Lasix 40 mg IV BID      12/10:  Breathing treatments  Transition from solumedrol to PO prednisone  Azithromycin   Wean O2 as tolerated     12/11:  Cont breathing treatments and steroids  Azithromycin  Cont to wean fiO2 as tolerated    12/12:  Decreased breathing txt to q6h awake   Completed azithromycin  Currently down to fiO2 of 30%  Will continue to wean   Pt on 3L at home    12/13:  Currently down to 25L FiO2 30%  Cont to wean     12/14:  Currently on 20L and FiO2 of 30%  Continue to wean down to NC  On 3L at home   Goal spO2 88-92    12/15:  Continue to wean O2 as tolerated    Weakness  12/12:  Awaiting placement for SNF    12/13:  Awaiting placement     12/14:  PT/OT possible dc home with home health and PT/OT  Will discuss with case management  Possible dc home as opposed to SNF    12/15:  Cont PT/OT  Possible DC home, SNF placement pending in case patient continues to require SNF    Essential hypertension  Continue B blocker     12/10:  BP controlled     12/11:  BP controlledc     12/12:  BP elevated this am however it has been controlled most of admission  Cont current management, cont to monitor     Acute on chronic congestive heart failure  Strict I & O   Daily weights  Fluid restriction   Low sodium diet   Check 2 D  Echo     12/10:  Continue diuresis with lasix bid  Cont to monitor   Reviewed echo          Atrial fibrillation with RVR  Admitted with A Fib with RVR   Cardizem drip   Continue B blocker   Cardiology Consult     12/10:  cardizem drip dc and transitioned to PO cardizem  Cont to monitor HR   pradaxa bid    12/11:  Rate controlled  Continue cardizem  PO pradaxa bid     12/15:  stable          VTE Risk Mitigation (From admission, onward)         Ordered     dabigatran etexilate capsule 150 mg  2 times daily      12/09/19 0341                      Sanchez Joseph MD  Department of Hospital Medicine   [facility]

## 2019-12-15 NOTE — ASSESSMENT & PLAN NOTE
Admitted with A Fib with RVR   Cardizem drip   Continue B blocker   Cardiology Consult     12/10:  cardizem drip dc and transitioned to PO cardizem  Cont to monitor HR   pradaxa bid    12/11:  Rate controlled  Continue cardizem  PO pradaxa bid     12/15:  stable

## 2019-12-15 NOTE — PLAN OF CARE
Pt AAO x3.  VSS.  Pt remained afebrile throughout this shift.   Pt remained free of falls this shift.   Pt c/o of no pain this shift.  Plan of care reviewed. Patient verbalizes understanding.   Pt moving/turing independently. Frequent weight shifting encouraged.  Patient AFib on monitor.   Bed low, side rails up x 2, wheels locked, call light in reach.   Bed alarm maintained for safety.   Patient instructed to call for assistance.   Hourly rounding completed.   24 hour chart check completed.  Will continue to monitor.

## 2019-12-15 NOTE — SUBJECTIVE & OBJECTIVE
Interval History: Denies any issues overnight.     Review of Systems   Constitutional: Negative for fatigue and fever.   Respiratory: Negative for shortness of breath.    Cardiovascular: Negative for chest pain.   Gastrointestinal: Negative for nausea and vomiting.   Skin: Negative for rash.     Objective:     Vital Signs (Most Recent):  Temp: 97.9 °F (36.6 °C) (12/15/19 0744)  Pulse: (!) 112 (12/15/19 1133)  Resp: 18 (12/15/19 0744)  BP: 117/60 (12/15/19 0744)  SpO2: (!) 92 % (12/15/19 0744) Vital Signs (24h Range):  Temp:  [97.2 °F (36.2 °C)-98.5 °F (36.9 °C)] 97.9 °F (36.6 °C)  Pulse:  [] 112  Resp:  [18-20] 18  SpO2:  [88 %-97 %] 92 %  BP: (102-143)/(58-75) 117/60     Weight: 96.6 kg (212 lb 15.4 oz)  Body mass index is 28.1 kg/m².    Intake/Output Summary (Last 24 hours) at 12/15/2019 1142  Last data filed at 12/15/2019 0501  Gross per 24 hour   Intake 1041 ml   Output 3175 ml   Net -2134 ml      Physical Exam   Constitutional: He is oriented to person, place, and time. He appears well-developed and well-nourished. No distress.   Cardiovascular: Normal rate, regular rhythm and normal heart sounds. Exam reveals no gallop and no friction rub.   No murmur heard.  Pulmonary/Chest: Effort normal. No stridor. No respiratory distress. He has no wheezes. He has no rales.   Abdominal: Soft. Bowel sounds are normal. He exhibits no distension and no mass. There is no tenderness. There is no guarding.   Musculoskeletal: He exhibits no edema.   Neurological: He is alert and oriented to person, place, and time.   Skin: Skin is warm. No rash noted. He is not diaphoretic.       Significant Labs:   Recent Lab Results       12/15/19  0412        Anion Gap 7     Baso # 0.01     Basophil% 0.1     BUN, Bld 27     Calcium 9.2     Chloride 92     CO2 43  Comment:  CO2  critical result(s) called and verbal readback obtained from   Scott Domingo RN by KAL 12/15/2019 05:45       Creatinine 0.8     Differential Method Automated      eGFR if  >60     eGFR if non  >60  Comment:  Calculation used to obtain the estimated glomerular filtration  rate (eGFR) is the CKD-EPI equation.        Eos # 0.1     Eosinophil% 0.4     Glucose 101     Gran # (ANC) 9.0     Gran% 73.9     Hematocrit 36.9     Hemoglobin 11.2     Immature Grans (Abs) 0.09  Comment:  Mild elevation in immature granulocytes is non specific and   can be seen in a variety of conditions including stress response,   acute inflammation, trauma and pregnancy. Correlation with other   laboratory and clinical findings is essential.       Immature Granulocytes 0.7     Lymph # 1.3     Lymph% 10.8     MCH 30.9     MCHC 30.4          Mono # 1.7     Mono% 14.1     MPV 10.6     nRBC 0     Platelets 205     Potassium 4.1     RBC 3.62     RDW 13.2     Sodium 142     WBC 12.18         All pertinent labs within the past 24 hours have been reviewed.    Significant Imaging: I have reviewed all pertinent imaging results/findings within the past 24 hours.

## 2019-12-15 NOTE — PT/OT/SLP PROGRESS
Physical Therapy  Treatment    Nicolás Marin   MRN: [id number]   Admitting Diagnosis: Acute on chronic respiratory failure with hypoxia       PT Start Time: 0840     PT Stop Time: 0855    PT Total Time (min): 15 min       Billable Minutes:  Gait Training 15    Treatment Type: Treatment  PT/PTA: PTA     PTA Visit Number: 1       General Precautions: Standard, fall  Orthopedic Precautions: N/A   Braces:           Subjective:  Communicated with NSG prior to session.      Pain/Comfort  Pain Rating 1: 0/10  Pain Rating Post-Intervention 1: 0/10    Objective:        Functional Mobility:  Bed Mobility:        Transfers:       Gait:        Stairs:      Bed Mobility   Transfers:  Gait:     Balance:   Static Sit:   Dynamic Sit:   Static Stand:   Dynamic stand:      Therapeutic Activities and Exercises:  BED MOB AND T/F'S SBA  AMBULATED WITH CG NO AD 40' IN ROOM  PATIENT ATTEMPTS TO GUIDE THERAPY SESSION      AM-PAC 6 CLICK MOBILITY  How much help from another person does this patient currently need?   1 = Unable, Total/Dependent Assistance  2 = A lot, Maximum/Moderate Assistance  3 = A little, Minimum/Contact Guard/Supervision  4 = None, Modified [place]/Independent         AM-PAC Raw Score CMS G-Code Modifier Level of Impairment Assistance   6 % Total / Unable   7 - 9 CM 80 - 100% Maximal Assist   10 - 14 CL 60 - 80% Moderate Assist   15 - 19 CK 40 - 60% Moderate Assist   20 - 22 CJ 20 - 40% Minimal Assist   23 CI 1-20% SBA / CGA   24 CH 0% Independent/ Mod I     Patient left supine with all lines intact, call button in reach and bed alarm on.    Assessment:  Nicolás Marin is a 79 y.o. male with a medical diagnosis of Acute on chronic respiratory failure with hypoxia and presents with .    Rehab identified problem list/impairments: Rehab identified problem list/impairments: weakness, impaired self care skills, impaired functional mobilty, impaired endurance, decreased lower extremity function    Rehab potential is  good.    Activity tolerance: Good    Discharge recommendations: Discharge Facility/Level of Care Needs: rehabilitation facility     Barriers to discharge:      Equipment recommendations: Equipment Needed After Discharge: walker, rolling, oxygen     GOALS:   Multidisciplinary Problems     Physical Therapy Goals        Problem: Physical Therapy Goal    Goal Priority Disciplines Outcome Goal Variances Interventions   Physical Therapy Goal     PT, PT/OT Ongoing, Progressing     Description:  PT WILL BE SEEN FOR P.T. FOR A MIN OF 5 OUT OF 7 DAYS A WEEK  LT19  1. PT WILL COMPLETE BED MOBILITY MITCH  2. PT WILL T/F TO CHAIR WITH RW AND MITCH  3. PT WILL GT TRAIN X 15' WITH RW AND CGA  4. PT WILL COMPLETE B LE TE X 20 REPS                    PLAN:    Patient to be seen 5 x/week  to address the above listed problems via therapeutic exercises, therapeutic activities, gait training  Plan of Care expires: 19  Plan of Care reviewed with: patient         Morgan Aguayo, PTA  12/15/2019

## 2019-12-16 LAB
ANION GAP SERPL CALC-SCNC: 10 MMOL/L (ref 8–16)
BASOPHILS # BLD AUTO: 0.01 K/UL (ref 0–0.2)
BASOPHILS NFR BLD: 0.1 % (ref 0–1.9)
BUN SERPL-MCNC: 21 MG/DL (ref 8–23)
CALCIUM SERPL-MCNC: 8.8 MG/DL (ref 8.7–10.5)
CHLORIDE SERPL-SCNC: 91 MMOL/L (ref 95–110)
CO2 SERPL-SCNC: 43 MMOL/L (ref 23–29)
CREAT SERPL-MCNC: 0.7 MG/DL (ref 0.5–1.4)
DIFFERENTIAL METHOD: ABNORMAL
EOSINOPHIL # BLD AUTO: 0 K/UL (ref 0–0.5)
EOSINOPHIL NFR BLD: 0.3 % (ref 0–8)
ERYTHROCYTE [DISTWIDTH] IN BLOOD BY AUTOMATED COUNT: 13.2 % (ref 11.5–14.5)
EST. GFR  (AFRICAN AMERICAN): >60 ML/MIN/1.73 M^2
EST. GFR  (NON AFRICAN AMERICAN): >60 ML/MIN/1.73 M^2
GLUCOSE SERPL-MCNC: 94 MG/DL (ref 70–110)
HCT VFR BLD AUTO: 37.9 % (ref 40–54)
HGB BLD-MCNC: 11.2 G/DL (ref 14–18)
IMM GRANULOCYTES # BLD AUTO: 0.1 K/UL (ref 0–0.04)
IMM GRANULOCYTES NFR BLD AUTO: 0.8 % (ref 0–0.5)
LYMPHOCYTES # BLD AUTO: 1.3 K/UL (ref 1–4.8)
LYMPHOCYTES NFR BLD: 10.3 % (ref 18–48)
MCH RBC QN AUTO: 30.5 PG (ref 27–31)
MCHC RBC AUTO-ENTMCNC: 29.6 G/DL (ref 32–36)
MCV RBC AUTO: 103 FL (ref 82–98)
MONOCYTES # BLD AUTO: 1.8 K/UL (ref 0.3–1)
MONOCYTES NFR BLD: 14.8 % (ref 4–15)
NEUTROPHILS # BLD AUTO: 9.2 K/UL (ref 1.8–7.7)
NEUTROPHILS NFR BLD: 73.7 % (ref 38–73)
NRBC BLD-RTO: 0 /100 WBC
PLATELET # BLD AUTO: 205 K/UL (ref 150–350)
PMV BLD AUTO: 10.5 FL (ref 9.2–12.9)
POTASSIUM SERPL-SCNC: 3.9 MMOL/L (ref 3.5–5.1)
RBC # BLD AUTO: 3.67 M/UL (ref 4.6–6.2)
SODIUM SERPL-SCNC: 144 MMOL/L (ref 136–145)
WBC # BLD AUTO: 12.42 K/UL (ref 3.9–12.7)

## 2019-12-16 PROCEDURE — 21400001 HC TELEMETRY ROOM

## 2019-12-16 PROCEDURE — 80048 BASIC METABOLIC PNL TOTAL CA: CPT

## 2019-12-16 PROCEDURE — 27000221 HC OXYGEN, UP TO 24 HOURS

## 2019-12-16 PROCEDURE — 25000003 PHARM REV CODE 250: Performed by: PHYSICIAN ASSISTANT

## 2019-12-16 PROCEDURE — 85025 COMPLETE CBC W/AUTO DIFF WBC: CPT

## 2019-12-16 PROCEDURE — 97110 THERAPEUTIC EXERCISES: CPT

## 2019-12-16 PROCEDURE — 25000242 PHARM REV CODE 250 ALT 637 W/ HCPCS: Performed by: FAMILY MEDICINE

## 2019-12-16 PROCEDURE — 36415 COLL VENOUS BLD VENIPUNCTURE: CPT

## 2019-12-16 PROCEDURE — 25000003 PHARM REV CODE 250: Performed by: NURSE PRACTITIONER

## 2019-12-16 PROCEDURE — 99900035 HC TECH TIME PER 15 MIN (STAT)

## 2019-12-16 PROCEDURE — 97116 GAIT TRAINING THERAPY: CPT

## 2019-12-16 PROCEDURE — 25000242 PHARM REV CODE 250 ALT 637 W/ HCPCS: Performed by: NURSE PRACTITIONER

## 2019-12-16 PROCEDURE — 94640 AIRWAY INHALATION TREATMENT: CPT

## 2019-12-16 PROCEDURE — 94761 N-INVAS EAR/PLS OXIMETRY MLT: CPT

## 2019-12-16 PROCEDURE — 63600175 PHARM REV CODE 636 W HCPCS: Performed by: NURSE PRACTITIONER

## 2019-12-16 RX ADMIN — IPRATROPIUM BROMIDE AND ALBUTEROL SULFATE 3 ML: .5; 3 SOLUTION RESPIRATORY (INHALATION) at 01:12

## 2019-12-16 RX ADMIN — PANTOPRAZOLE SODIUM 40 MG: 40 TABLET, DELAYED RELEASE ORAL at 08:12

## 2019-12-16 RX ADMIN — FINASTERIDE 5 MG: 5 TABLET, FILM COATED ORAL at 08:12

## 2019-12-16 RX ADMIN — FUROSEMIDE 40 MG: 10 INJECTION, SOLUTION INTRAMUSCULAR; INTRAVENOUS at 08:12

## 2019-12-16 RX ADMIN — DABIGATRAN ETEXILATE MESYLATE 150 MG: 150 CAPSULE ORAL at 10:12

## 2019-12-16 RX ADMIN — BUDESONIDE 0.5 MG: 0.5 INHALANT RESPIRATORY (INHALATION) at 07:12

## 2019-12-16 RX ADMIN — FUROSEMIDE 40 MG: 10 INJECTION, SOLUTION INTRAMUSCULAR; INTRAVENOUS at 05:12

## 2019-12-16 RX ADMIN — METOPROLOL SUCCINATE 100 MG: 50 TABLET, FILM COATED, EXTENDED RELEASE ORAL at 08:12

## 2019-12-16 RX ADMIN — TAMSULOSIN HYDROCHLORIDE 0.4 MG: 0.4 CAPSULE ORAL at 08:12

## 2019-12-16 RX ADMIN — ATORVASTATIN CALCIUM 40 MG: 40 TABLET, FILM COATED ORAL at 08:12

## 2019-12-16 RX ADMIN — IPRATROPIUM BROMIDE AND ALBUTEROL SULFATE 3 ML: .5; 3 SOLUTION RESPIRATORY (INHALATION) at 07:12

## 2019-12-16 RX ADMIN — THERA TABS 1 TABLET: TAB at 08:12

## 2019-12-16 RX ADMIN — DILTIAZEM HYDROCHLORIDE 180 MG: 180 CAPSULE, COATED, EXTENDED RELEASE ORAL at 08:12

## 2019-12-16 RX ADMIN — BUDESONIDE 0.5 MG: 0.5 INHALANT RESPIRATORY (INHALATION) at 06:12

## 2019-12-16 RX ADMIN — DABIGATRAN ETEXILATE MESYLATE 150 MG: 150 CAPSULE ORAL at 08:12

## 2019-12-16 RX ADMIN — IPRATROPIUM BROMIDE AND ALBUTEROL SULFATE 3 ML: .5; 3 SOLUTION RESPIRATORY (INHALATION) at 06:12

## 2019-12-16 NOTE — PLAN OF CARE
PATIENT VERY IMPULSIVE DURING GT IN ROOM AT [facility] 'GRABBING O2 TANK FOR SUPPORT'. GT AT 20'X2 MIN ASSIST X1 AT [facility].

## 2019-12-16 NOTE — PLAN OF CARE
Pt AAO x 4.  VSS.  Pt remained afebrile throughout this shift.   Pt remained free of falls this shift.   Pt c/o of no pain this shift.  Plan of care reviewed. Patient verbalizes understanding.   Pt moving/turing independently. Frequent weight shifting encouraged.  Patient AFib on monitor.   Bed low, side rails up x 2, wheels locked, call light in reach.   Bed alarm maintained for safety.   Patient instructed to call for assistance.   Hourly rounding completed.   24 hour chart check completed.  Will continue to monitor.

## 2019-12-16 NOTE — PLAN OF CARE
Patient A&Ox4.  Currently at 5L of oxygen. Uses 3L at home.  Afib on tele. HR controlled.  Regular diet.  Uses urinal.  Skin intact.  IV lasix given   No complaints of pain.  All questions answered.  Will continue to monitor.    Yamileth Foster RN

## 2019-12-16 NOTE — PT/OT/SLP PROGRESS
Physical Therapy  Treatment    Nicolás Marin   MRN: [id number]   Admitting Diagnosis: Acute on chronic respiratory failure with hypoxia    PT Received On: 12/16/19  PT Start Time: 1200     PT Stop Time: 1225    PT Total Time (min): 25 min       Billable Minutes:  Gait Training 15 and Therapeutic Exercise 10    Treatment Type: Treatment  PT/PTA: PTA     PTA Visit Number: 2       General Precautions: Standard, fall  Orthopedic Precautions: N/A   Braces: N/A    Spiritual, Cultural Beliefs, Judaism Practices, Values that Affect Care: no    Subjective:  Communicated with PATIENT NURSE prior to session.      Pain/Comfort  Pain Rating 1: 0/10    Objective:   Patient found with: peripheral IV, telemetry, oxygen, SHORTSEATED IN BED. ASSISTED PATIENT WITH OOB T/FS FOR GT IN ROOM , FERNANDO LE EXERCISE INSTRUCTION, SAFETY AWARENESS EDUCATION.    Functional Mobility:  Bed Mobility:    NOT ATTEMPTED THIS TX .    Transfers:   SIT TO STAND, STAND TO SIT AT CGAX1.    Gait:    20'X2 AT HHAX1 TO MIN ASSIST X1, PATIENT UNSTEADY, IMPULSIVE AT TIMES,'GRABBING O2 TANK FOR SUPPORT.    Stairs:  N/A    Balance:   Static Sit: FAIR: Maintains without assist, but unable to take any challenges   Dynamic Sit: FAIR: Cannot move trunk without losing balance  Static Stand: FAIR: Maintains without assist but unable to take challenges  Dynamic stand: FAIR: Needs CONTACT GUARD during gait     Therapeutic Activities and Exercises:  FERNANDO LE EXERCISES, GT IN PATIENT ROOM, SAFETY AWARENESS EDUCATION.    AM-PAC 6 CLICK MOBILITY  How much help from another person does this patient currently need?   1 = Unable, Total/Dependent Assistance  2 = A lot, Maximum/Moderate Assistance  3 = A little, Minimum/Contact Guard/Supervision  4 = None, Modified [place]/Independent    Turning over in bed (including adjusting bedclothes, sheets and blankets)?: 4  Sitting down on and standing up from a chair with arms (e.g., wheelchair, bedside commode, etc.): 3  Moving from lying on  back to sitting on the side of the bed?: 4  Moving to and from a bed to a chair (including a wheelchair)?: 3  Need to walk in hospital room?: 3  Climbing 3-5 steps with a railing?: 1  Basic Mobility Total Score: 18    AM-PAC Raw Score CMS G-Code Modifier Level of Impairment Assistance   6 % Total / Unable   7 - 9 CM 80 - 100% Maximal Assist   10 - 14 CL 60 - 80% Moderate Assist   15 - 19 CK 40 - 60% Moderate Assist   20 - 22 CJ 20 - 40% Minimal Assist   23 CI 1-20% SBA / CGA   24 CH 0% Independent/ Mod I     Patient left SHORTSEATED IN BED with all lines intact, call button in reach and bed alarm on.    Assessment:  Nicolás Marin is a 79 y.o. male with a medical diagnosis of Acute on chronic respiratory failure with hypoxia . PATIENT IMPULSIVE DURING GT ACTIVITY. PATIENT REQUIRE MIN TO HHAX1 DURING GT . PATIENT IS UNSTEADY AT TIMES ON HIS FEET.    Rehab identified problem list/impairments: Rehab identified problem list/impairments: weakness, impaired self care skills, impaired balance, decreased coordination, decreased safety awareness, gait instability, impaired endurance, impaired functional mobilty, decreased lower extremity function    Rehab potential is good.    Activity tolerance: Good    Discharge recommendations: Discharge Facility/Level of Care Needs: rehabilitation facility     Barriers to discharge:      Equipment recommendations: Equipment Needed After Discharge: walker, rolling, oxygen     GOALS:   Multidisciplinary Problems     Physical Therapy Goals        Problem: Physical Therapy Goal    Goal Priority Disciplines Outcome Goal Variances Interventions   Physical Therapy Goal     PT, PT/OT Ongoing, Progressing     Description:  PT WILL BE SEEN FOR P.T. FOR A MIN OF 5 OUT OF 7 DAYS A WEEK  LT19  1. PT WILL COMPLETE BED MOBILITY MITCH  2. PT WILL T/F TO CHAIR WITH RW AND MITCH  3. PT WILL GT TRAIN X 15' WITH RW AND CGA  4. PT WILL COMPLETE B LE TE X 20 REPS                    PLAN:    Patient  to be seen 5 x/week  to address the above listed problems via gait training, therapeutic activities, therapeutic exercises  Plan of Care expires: 12/17/19  Plan of Care reviewed with: patient         Dolores Keller, PTA  12/16/2019

## 2019-12-16 NOTE — PLAN OF CARE
CM met with patient at the bedside to discuss the discharge plan.  Patient states that he still feels like he needs SNF placement prior to going home.  He currently is on 5l via nasal cannula but his home prescription is 3l.  RUD[facility] into a [facility].  Awaiting a call back from Jess.     12/16/19 1054   Discharge Reassessment   Assessment Type Discharge Planning Reassessment   Provided patient/caregiver education on the expected discharge date and the discharge plan Yes   Do you have any problems affording any of your prescribed medications? No   Discharge Plan A Skilled Nursing Facility   Discharge Plan B Home Health   DME Needed Upon Discharge  none   Patient choice form signed by patient/caregiver N/A   Anticipated Discharge Disposition SNF   Can the patient answer the patient profile reliably? Yes, cognitively intact

## 2019-12-16 NOTE — PLAN OF CARE
Patient awake, alert and oriented at times  Patient able to make needs known.   Needs met by staff for shift.  No acute distress noted, respirations even and unlabored.   Patient denies pain at this time.   PRN pain meds administered per MAR.  IV site patent and intact, no redness.  No other complaints voiced at this time.   Continue on telemetry monitoring.   Will continue to monitor.

## 2019-12-17 VITALS
OXYGEN SATURATION: 98 % | TEMPERATURE: 97 F | RESPIRATION RATE: 18 BRPM | DIASTOLIC BLOOD PRESSURE: 66 MMHG | HEIGHT: 73 IN | WEIGHT: 210.31 LBS | BODY MASS INDEX: 27.87 KG/M2 | SYSTOLIC BLOOD PRESSURE: 113 MMHG | HEART RATE: 92 BPM

## 2019-12-17 LAB
ALLENS TEST: ABNORMAL
ANION GAP SERPL CALC-SCNC: 5 MMOL/L (ref 8–16)
BASOPHILS # BLD AUTO: 0.02 K/UL (ref 0–0.2)
BASOPHILS NFR BLD: 0.2 % (ref 0–1.9)
BUN SERPL-MCNC: 22 MG/DL (ref 8–23)
CALCIUM SERPL-MCNC: 8.8 MG/DL (ref 8.7–10.5)
CHLORIDE SERPL-SCNC: 90 MMOL/L (ref 95–110)
CO2 SERPL-SCNC: 46 MMOL/L (ref 23–29)
CREAT SERPL-MCNC: 0.7 MG/DL (ref 0.5–1.4)
DELSYS: ABNORMAL
DIFFERENTIAL METHOD: ABNORMAL
EOSINOPHIL # BLD AUTO: 0.1 K/UL (ref 0–0.5)
EOSINOPHIL NFR BLD: 0.9 % (ref 0–8)
ERYTHROCYTE [DISTWIDTH] IN BLOOD BY AUTOMATED COUNT: 13.1 % (ref 11.5–14.5)
EST. GFR  (AFRICAN AMERICAN): >60 ML/MIN/1.73 M^2
EST. GFR  (NON AFRICAN AMERICAN): >60 ML/MIN/1.73 M^2
FLOW: 5
GLUCOSE SERPL-MCNC: 90 MG/DL (ref 70–110)
HCO3 UR-SCNC: 48.6 MMOL/L (ref 24–28)
HCT VFR BLD AUTO: 36.1 % (ref 40–54)
HGB BLD-MCNC: 10.5 G/DL (ref 14–18)
IMM GRANULOCYTES # BLD AUTO: 0.13 K/UL (ref 0–0.04)
IMM GRANULOCYTES NFR BLD AUTO: 1 % (ref 0–0.5)
LYMPHOCYTES # BLD AUTO: 1.3 K/UL (ref 1–4.8)
LYMPHOCYTES NFR BLD: 9.8 % (ref 18–48)
MCH RBC QN AUTO: 30.9 PG (ref 27–31)
MCHC RBC AUTO-ENTMCNC: 29.1 G/DL (ref 32–36)
MCV RBC AUTO: 106 FL (ref 82–98)
MODE: ABNORMAL
MONOCYTES # BLD AUTO: 2.1 K/UL (ref 0.3–1)
MONOCYTES NFR BLD: 15.9 % (ref 4–15)
NEUTROPHILS # BLD AUTO: 9.5 K/UL (ref 1.8–7.7)
NEUTROPHILS NFR BLD: 72.2 % (ref 38–73)
NRBC BLD-RTO: 0 /100 WBC
PCO2 BLDA: 80.1 MMHG (ref 35–45)
PH SMN: 7.39 [PH] (ref 7.35–7.45)
PLATELET # BLD AUTO: 209 K/UL (ref 150–350)
PMV BLD AUTO: 10.2 FL (ref 9.2–12.9)
PO2 BLDA: 95 MMHG (ref 80–100)
POC BE: 24 MMOL/L
POC SATURATED O2: 97 % (ref 95–100)
POTASSIUM SERPL-SCNC: 4.6 MMOL/L (ref 3.5–5.1)
RBC # BLD AUTO: 3.4 M/UL (ref 4.6–6.2)
SAMPLE: ABNORMAL
SITE: ABNORMAL
SODIUM SERPL-SCNC: 141 MMOL/L (ref 136–145)
WBC # BLD AUTO: 13.19 K/UL (ref 3.9–12.7)

## 2019-12-17 PROCEDURE — 97530 THERAPEUTIC ACTIVITIES: CPT | Performed by: PHYSICAL THERAPIST

## 2019-12-17 PROCEDURE — 94640 AIRWAY INHALATION TREATMENT: CPT

## 2019-12-17 PROCEDURE — 25000003 PHARM REV CODE 250: Performed by: NURSE PRACTITIONER

## 2019-12-17 PROCEDURE — 25000242 PHARM REV CODE 250 ALT 637 W/ HCPCS: Performed by: FAMILY MEDICINE

## 2019-12-17 PROCEDURE — 99900035 HC TECH TIME PER 15 MIN (STAT)

## 2019-12-17 PROCEDURE — 97116 GAIT TRAINING THERAPY: CPT

## 2019-12-17 PROCEDURE — 27100171 HC OXYGEN HIGH FLOW UP TO 24 HOURS

## 2019-12-17 PROCEDURE — 25000003 PHARM REV CODE 250: Performed by: PHYSICIAN ASSISTANT

## 2019-12-17 PROCEDURE — 94761 N-INVAS EAR/PLS OXIMETRY MLT: CPT

## 2019-12-17 PROCEDURE — 63600175 PHARM REV CODE 636 W HCPCS: Performed by: NURSE PRACTITIONER

## 2019-12-17 PROCEDURE — 36600 WITHDRAWAL OF ARTERIAL BLOOD: CPT

## 2019-12-17 PROCEDURE — 36415 COLL VENOUS BLD VENIPUNCTURE: CPT

## 2019-12-17 PROCEDURE — 80048 BASIC METABOLIC PNL TOTAL CA: CPT

## 2019-12-17 PROCEDURE — 97530 THERAPEUTIC ACTIVITIES: CPT

## 2019-12-17 PROCEDURE — 25000242 PHARM REV CODE 250 ALT 637 W/ HCPCS: Performed by: NURSE PRACTITIONER

## 2019-12-17 PROCEDURE — 85025 COMPLETE CBC W/AUTO DIFF WBC: CPT

## 2019-12-17 RX ORDER — DILTIAZEM HYDROCHLORIDE 180 MG/1
180 CAPSULE, COATED, EXTENDED RELEASE ORAL DAILY
Qty: 30 CAPSULE | Refills: 11 | Status: SHIPPED | OUTPATIENT
Start: 2019-12-18 | End: 2020-12-17

## 2019-12-17 RX ADMIN — DILTIAZEM HYDROCHLORIDE 180 MG: 180 CAPSULE, COATED, EXTENDED RELEASE ORAL at 08:12

## 2019-12-17 RX ADMIN — IPRATROPIUM BROMIDE AND ALBUTEROL SULFATE 3 ML: .5; 3 SOLUTION RESPIRATORY (INHALATION) at 08:12

## 2019-12-17 RX ADMIN — METOPROLOL SUCCINATE 100 MG: 50 TABLET, FILM COATED, EXTENDED RELEASE ORAL at 08:12

## 2019-12-17 RX ADMIN — PANTOPRAZOLE SODIUM 40 MG: 40 TABLET, DELAYED RELEASE ORAL at 08:12

## 2019-12-17 RX ADMIN — FUROSEMIDE 40 MG: 10 INJECTION, SOLUTION INTRAMUSCULAR; INTRAVENOUS at 08:12

## 2019-12-17 RX ADMIN — IPRATROPIUM BROMIDE AND ALBUTEROL SULFATE 3 ML: .5; 3 SOLUTION RESPIRATORY (INHALATION) at 01:12

## 2019-12-17 RX ADMIN — FINASTERIDE 5 MG: 5 TABLET, FILM COATED ORAL at 08:12

## 2019-12-17 RX ADMIN — BUDESONIDE 0.5 MG: 0.5 INHALANT RESPIRATORY (INHALATION) at 08:12

## 2019-12-17 RX ADMIN — TAMSULOSIN HYDROCHLORIDE 0.4 MG: 0.4 CAPSULE ORAL at 08:12

## 2019-12-17 RX ADMIN — DABIGATRAN ETEXILATE MESYLATE 150 MG: 150 CAPSULE ORAL at 08:12

## 2019-12-17 RX ADMIN — THERA TABS 1 TABLET: TAB at 08:12

## 2019-12-17 RX ADMIN — ATORVASTATIN CALCIUM 40 MG: 40 TABLET, FILM COATED ORAL at 08:12

## 2019-12-17 NOTE — PT/OT/SLP PROGRESS
Physical Therapy  Treatment    Nicolás Marin   MRN: [id number]   Admitting Diagnosis: Acute on chronic respiratory failure with hypoxia    PT Received On: 12/17/19  PT Start Time: 1105     PT Stop Time: 1130    PT Total Time (min): 25 min       Billable Minutes:  Gait Training 15 and Therapeutic Activity 10    Treatment Type: Treatment  PT/PTA: PT          General Precautions: Standard, respiratory  Orthopedic Precautions: N/A   Braces: N/A    Subjective:  Communicated with NURSE CALI prior to session.  Pain/Comfort  Pain Rating 1: 5/10  Location - Side 1: Right  Location - Orientation 1: upper  Location 1: shoulder    Objective:   Patient found with: peripheral IV, telemetry, oxygen    Functional Mobility:  Therapeutic Activities and Exercises:  PT FOUND SUPINE IN BED UPON ARRIVAL, SUP>SIT MICTH, SIT>STAND WITH SPV, PT ' WITH RW AND SPV, NO LOB OR SOB WITH O2 IN TOW, GOOD DYNAMIC BALANCE, PT RETURN TO ROOM TO SITTING AT EOB PER HIS REQUEST, REVIEW BLE THEREX TO PERFORM WHILE SEATED AT EOB.  PT WASHED FACE WITH SET UP, ALIX GOMEZ WITH SET UP     AM-PAC 6 CLICK MOBILITY  How much help from another person does this patient currently need?   1 = Unable, Total/Dependent Assistance  2 = A lot, Maximum/Moderate Assistance  3 = A little, Minimum/Contact Guard/Supervision  4 = None, Modified [place]/Independent    Turning over in bed (including adjusting bedclothes, sheets and blankets)?: 4  Sitting down on and standing up from a chair with arms (e.g., wheelchair, bedside commode, etc.): 4  Moving from lying on back to sitting on the side of the bed?: 4  Moving to and from a bed to a chair (including a wheelchair)?: 4  Need to walk in hospital room?: 4  Climbing 3-5 steps with a railing?: 1  Basic Mobility Total Score: 21    AM-PAC Raw Score CMS G-Code Modifier Level of Impairment Assistance   6 % Total / Unable   7 - 9 CM 80 - 100% Maximal Assist   10 - 14 CL 60 - 80% Moderate Assist   15 - 19 CK 40 - 60%  Moderate Assist   20 - 22 CJ 20 - 40% Minimal Assist   23 CI 1-20% SBA / CGA   24 CH 0% Independent/ Mod I     Patient left SEATED AT EOB with all lines intact, call button in reach and NURSE notified.    Assessment:  Nicolás Marin is a 79 y.o. male with a medical diagnosis of Acute on chronic respiratory failure with hypoxia   PT IS NO LONGER A CANDIDATE FOR INPATIENT SKILLED P.T DUE TO HIGH LEVEL OF FUNCTION, PT WILL BENEFIT FROM [facility] FOR CONT. GAIT/TE    Rehab identified problem list/impairments:     Rehab potential is .    Activity tolerance:     Discharge recommendations: Discharge Facility/Level of Care Needs: home health PT     Barriers to discharge:      Equipment recommendations: Equipment Needed After Discharge: walker, rolling     GOALS:   Multidisciplinary Problems     Physical Therapy Goals     Not on file          Multidisciplinary Problems (Resolved)        Problem: Physical Therapy Goal    Goal Priority Disciplines Outcome Goal Variances Interventions   Physical Therapy Goal   (Resolved)     PT, PT/OT Met     Description:  PT WILL BE SEEN FOR P.T. FOR A MIN OF 5 OUT OF 7 DAYS A WEEK  LT19  1. PT WILL COMPLETE BED MOBILITY MITCH  2. PT WILL T/F TO CHAIR WITH RW AND MITCH  3. PT WILL GT TRAIN X 15' WITH RW AND CGA  4. PT WILL COMPLETE B LE TE X 20 REPS                    PLAN:    D/C PT FROM P[facility]T. SERVICES TO [facility]     Katharine Smith, PT  2019

## 2019-12-17 NOTE — SIGNIFICANT EVENT
Patient with critical CO2 of 46 on AM labs.  CO2 appears to be gradually trending upward since admission.  STAT ABG ordered.  Patient seen and examined, in NAD.  Denies any complaints at present.  VS remain stable.          Pau Brown, NP

## 2019-12-17 NOTE — SUBJECTIVE & OBJECTIVE
Interval History:  No acute problems or complaints.Shortness of breath with exertion.    Review of Systems   Constitutional: Negative for fatigue and fever.   Respiratory: Negative for shortness of breath.    Cardiovascular: Negative for chest pain.   Gastrointestinal: Negative for nausea and vomiting.   Skin: Negative for rash.     Objective:     Vital Signs (Most Recent):  Temp: 97.2 °F (36.2 °C) (12/16/19 1905)  Pulse: 105 (12/16/19 1905)  Resp: 18 (12/16/19 1905)  BP: 123/60 (12/16/19 1905)  SpO2: (!) 94 % (12/16/19 1905) Vital Signs (24h Range):  Temp:  [97.2 °F (36.2 °C)-98.2 °F (36.8 °C)] 97.2 °F (36.2 °C)  Pulse:  [] 105  Resp:  [14-20] 18  SpO2:  [90 %-99 %] 94 %  BP: (102-127)/(59-90) 123/60     Weight: 93.8 kg (206 lb 12.7 oz)  Body mass index is 27.28 kg/m².    Intake/Output Summary (Last 24 hours) at 12/16/2019 2118  Last data filed at 12/16/2019 1201  Gross per 24 hour   Intake 1772 ml   Output 2675 ml   Net -903 ml      Physical Exam   Constitutional: He is oriented to person, place, and time. He appears well-developed and well-nourished. No distress.   Cardiovascular: Normal rate, regular rhythm and normal heart sounds. Exam reveals no gallop and no friction rub.   No murmur heard.  Pulmonary/Chest: Effort normal. No stridor. No respiratory distress. He has no wheezes. He has no rales.   Abdominal: Soft. Bowel sounds are normal. He exhibits no distension and no mass. There is no tenderness. There is no guarding.   Musculoskeletal: He exhibits no edema.   Neurological: He is alert and oriented to person, place, and time.   Skin: Skin is warm. No rash noted. He is not diaphoretic.       Significant Labs:   Recent Lab Results       12/16/19  0433        Anion Gap 10     Baso # 0.01     Basophil% 0.1     BUN, Bld 21     Calcium 8.8     Chloride 91     CO2 43  Comment:  CO2 critical result(s) called and verbal readback obtained from Siomara Foster by NADIA 12/16/2019 07:30       Creatinine 0.7      Differential Method Automated     eGFR if  >60     eGFR if non  >60  Comment:  Calculation used to obtain the estimated glomerular filtration  rate (eGFR) is the CKD-EPI equation.        Eos # 0.0     Eosinophil% 0.3     Glucose 94     Gran # (ANC) 9.2     Gran% 73.7     Hematocrit 37.9     Hemoglobin 11.2     Immature Grans (Abs) 0.10  Comment:  Mild elevation in immature granulocytes is non specific and   can be seen in a variety of conditions including stress response,   acute inflammation, trauma and pregnancy. Correlation with other   laboratory and clinical findings is essential.       Immature Granulocytes 0.8     Lymph # 1.3     Lymph% 10.3     MCH 30.5     MCHC 29.6          Mono # 1.8     Mono% 14.8     MPV 10.5     nRBC 0     Platelets 205     Potassium 3.9     RBC 3.67     RDW 13.2     Sodium 144     WBC 12.42         All pertinent labs within the past 24 hours have been reviewed.    Significant Imaging: I have reviewed all pertinent imaging results/findings within the past 24 hours.

## 2019-12-17 NOTE — PT/OT/SLP PROGRESS
"Occupational Therapy  Treatment    Nicolás Marin   MRN: [id number]   Admitting Diagnosis: Acute on chronic respiratory failure with hypoxia    OT Date of Treatment: 12/17/19   OT Start Time: 1050  OT Stop Time: 1115  OT Total Time (min): 25 min    Billable Minutes:  Therapeutic Activity 25 min    General Precautions: Standard, respiratory  Orthopedic Precautions: N/A  Braces: N/A         Subjective:  Communicated with Nurse Siomara and epic chart review prior to session.  Pt found supine in bed and agreeable to tx at this time.   Pain/Comfort  Pain Rating 1: 5/10  Location - Side 1: Right  Location - Orientation 1: upper  Location 1: shoulder    Objective:  Patient found with: peripheral IV, telemetry, oxygen     Functional Mobility:   Therapeutic Activities and Exercises:  Pt performed supine>sit with Mod I, scooted to EOB with Mod I, sit>stand usiing RW with Supervision, functional mobility using RW ~200ft with Supervision and O2 in tow, t/f to sitting EOB with Supervision. Pt performed G/H tasks while sitting EOB with setup.     AM-PAC 6 CLICK ADL   How much help from another person does this patient currently need?   1 = Unable, Total/Dependent Assistance  2 = A lot, Maximum/Moderate Assistance  3 = A little, Minimum/Contact Guard/Supervision  4 = None, Modified [place]/Independent    Putting on and taking off regular lower body clothing? : 3  Bathing (including washing, rinsing, drying)?: 3  Toileting, which includes using toilet, bedpan, or urinal? : 3  Putting on and taking off regular upper body clothing?: 4  Taking care of personal grooming such as brushing teeth?: 4  Eating meals?: 4  Daily Activity Total Score: 21     AM-PAC Raw Score CMS "G-Code Modifier Level of Impairment Assistance   6 % Total / Unable   7 - 8 CM 80 - 100% Maximal Assist   9-13 CL 60 - 80% Moderate Assist   14 - 19 CK 40 - 60% Moderate Assist   20 - 22 CJ 20 - 40% Minimal Assist   23 CI 1-20% SBA / CGA   24 CH 0% Independent/ Mod I "       Patient left sitting EOB with all lines intact, call button in reach and Nurse Siomara notified    ASSESSMENT:  Nicolás Marin is a 79 y.o. male with a medical diagnosis of Acute on chronic respiratory failure with hypoxia and presents with impaired functional mobility and ADLs. Pt will benefit from continued skilled OT in order to address impairments.    Rehab identified problem list/impairments: Rehab identified problem list/impairments: weakness, impaired endurance, impaired self care skills, impaired functional mobilty, decreased coordination, pain, decreased safety awareness, impaired balance, decreased lower extremity function, decreased upper extremity function, gait instability    Rehab potential is good.    Activity tolerance: Good    Discharge recommendations: Discharge Facility/Level of Care Needs: home health PT     Barriers to discharge: Barriers to Discharge: Decreased caregiver support    Equipment recommendations: walker, rolling     GOALS:   Multidisciplinary Problems     Occupational Therapy Goals     Not on file          Multidisciplinary Problems (Resolved)        Problem: Occupational Therapy Goal    Goal Priority Disciplines Outcome Interventions   Occupational Therapy Goal   (Resolved)     OT, PT/OT Met    Description:  ot goals to be met by 12-17-19  sba with ue dressing  sba with le dressing  Pt will tolerate 1 set x 15 reps b ue rom exercise  sba with bsc/ toilet  t/f's                    Plan:  Patient to be seen 3 x/week to address the above listed problems via self-care/home management, therapeutic activities, therapeutic exercises  Plan of Care expires: 12/17/19  Plan of Care reviewed with: patient    OT G-codes  Functional Assessment Tool Used: [facility]  Score: 21    Delilah Palmer, PT/OT  12/17/2019

## 2019-12-17 NOTE — NURSING
Discharge instructions reviewed with patient.  Printed prescription given to patient per request.  Instructed to call primary doctor for follow up. Encouraged to go to Cardiology follow up appointment.  Heart monitor off and returned to tech.  IV taken out.  All questions answered.  Instructed to inform staff when transportation arrives.    Yamileth Foster RN

## 2019-12-17 NOTE — PROGRESS NOTES
Patient currently resting on NC 5L with no distress. NPV at bs. MD consulted and aware at time. JAVON Stringer also noted. Will continue to monitor

## 2019-12-17 NOTE — PLAN OF CARE
[facility].  Patient is only 30% service connected and the only SNF that would be covered is in a [facility] facility.  The VA facilities have no bed availability at this time.  Jess requested that RUDI complete the [facility] worksheet and fax to her so that home health can be arranged.  RUDI requested a home health order from Dr Espinoza.

## 2019-12-17 NOTE — PLAN OF CARE
[facility] will [facility].  [facility] fax[facility] to the [facility] for home health to be arranged     12/17/19 8472   Final Note   Assessment Type Final Discharge Note   Anticipated Discharge Disposition Home-Health   Right Care Referral Info   Post Acute Recommendation Home-care   Facility Name To be arranged by the [facility]

## 2019-12-17 NOTE — PROGRESS NOTES
[facility] Hospital Medicine  Progress Note    Patient Name: Nicolás Marin  MRN: [id number]  Patient Class: IP- Inpatient   Admission Date: 12/8/2019  Length of Stay: 7 days  Attending Physician: Michael Espinoza MD  Primary Care Provider: Yuli Lau MD        Subjective:     Principal Problem:Acute on chronic respiratory failure with hypoxia    HPI:  Mr Marin is a 79 year old male with PMHx of COPD on home O 2, HTN, A Fib on Pradaxa who presented to [facility] with complaints of progressive shortness of breath that has continue to worsen.  Denies associated symptoms chest pain, shortness of breath, fever, chills, nausea or vomiting. [facility] and is a poor historian. EKG in the Emergency Room showed A Fib with RVR rate in of 135, he was started of Cardizem drip in the Emergency Room. CT of chest showed bilateral plural effusions, Rt greater that left. . Troponin negative. He is a full code, granddaughter Katharine, is surrogate decision make. [facility].      Overview/Hospital Course:  12/10: stable on vapotherm, continue to wean as tolerated   12/11-12/13: weaning vapotherm, placement pending  12/14: pt improving can possibly go home with PT/OT. Cont to wean O2  12/15: cont to wean vapotherm, improving. Dc when back on Nasal cannula at 3L (home rate). Continue breathing treatments    Interval History:  No acute problems or complaints.Shortness of breath with exertion.    Review of Systems   Constitutional: Negative for fatigue and fever.   Respiratory: Negative for shortness of breath.    Cardiovascular: Negative for chest pain.   Gastrointestinal: Negative for nausea and vomiting.   Skin: Negative for rash.     Objective:     Vital Signs (Most Recent):  Temp: 97.2 °F (36.2 °C) (12/16/19 1905)  Pulse: 105 (12/16/19 1905)  Resp: 18 (12/16/19 1905)  BP: 123/60 (12/16/19 1905)  SpO2: (!) 94 % (12/16/19 1905)  Vital Signs (24h Range):  Temp:  [97.2 °F (36.2 °C)-98.2 °F (36.8 °C)] 97.2 °F (36.2 °C)  Pulse:  [] 105  Resp:  [14-20] 18  SpO2:  [90 %-99 %] 94 %  BP: (102-127)/(59-90) 123/60     Weight: 93.8 kg (206 lb 12.7 oz)  Body mass index is 27.28 kg/m².    Intake/Output Summary (Last 24 hours) at 12/16/2019 2118  Last data filed at 12/16/2019 1201  Gross per 24 hour   Intake 1772 ml   Output 2675 ml   Net -903 ml      Physical Exam   Constitutional: He is oriented to person, place, and time. He appears well-developed and well-nourished. No distress.   Cardiovascular: Normal rate, regular rhythm and normal heart sounds. Exam reveals no gallop and no friction rub.   No murmur heard.  Pulmonary/Chest: Effort normal. No stridor. No respiratory distress. He has no wheezes. He has no rales.   Abdominal: Soft. Bowel sounds are normal. He exhibits no distension and no mass. There is no tenderness. There is no guarding.   Musculoskeletal: He exhibits no edema.   Neurological: He is alert and oriented to person, place, and time.   Skin: Skin is warm. No rash noted. He is not diaphoretic.       Significant Labs:   Recent Lab Results       12/16/19  0433        Anion Gap 10     Baso # 0.01     Basophil% 0.1     BUN, Bld 21     Calcium 8.8     Chloride 91     CO2 43  Comment:  CO2 critical result(s) called and verbal readback obtained from Siomara Foster by NADIA 12/16/2019 07:30       Creatinine 0.7     Differential Method Automated     eGFR if  >60     eGFR if non  >60  Comment:  Calculation used to obtain the estimated glomerular filtration  rate (eGFR) is the CKD-EPI equation.        Eos # 0.0     Eosinophil% 0.3     Glucose 94     Gran # (ANC) 9.2     Gran% 73.7     Hematocrit 37.9     Hemoglobin 11.2     Immature Grans (Abs) 0.10  Comment:  Mild elevation in immature granulocytes is non specific and   can be seen in a variety of conditions including stress response,   acute inflammation,  trauma and pregnancy. Correlation with other   laboratory and clinical findings is essential.       Immature Granulocytes 0.8     Lymph # 1.3     Lymph% 10.3     MCH 30.5     MCHC 29.6          Mono # 1.8     Mono% 14.8     MPV 10.5     nRBC 0     Platelets 205     Potassium 3.9     RBC 3.67     RDW 13.2     Sodium 144     WBC 12.42         All pertinent labs within the past 24 hours have been reviewed.    Significant Imaging: I have reviewed all pertinent imaging results/findings within the past 24 hours.      Assessment/Plan:      * Acute on chronic respiratory failure with hypoxia  Admit to Telemetry   Pt on home O 2 at 2-3 L   Venti mask at 50 %, wean as tolerated  Nebs   Probable related to CHF and plural effusions   Lasix 40 mg IV BID     Weakness  12/12:  Awaiting placement for SNF    12/13:  Awaiting placement     12/14:  PT/OT possible dc home with home health and PT/OT  Will discuss with case management  Possible dc home as opposed to SNF    12/15:  Cont PT/OT  Possible DC home, SNF placement pending in case patient continues to require SNF    Essential hypertension  Continue B blocker     12/10:  BP controlled     12/11:  BP controlledc     12/12:  BP elevated this am however it has been controlled most of admission  Cont current management, cont to monitor     Acute on chronic congestive heart failure  Strict I & O   Daily weights  Fluid restriction   Low sodium diet   Check 2 D Echo     12/10:  Continue diuresis with lasix bid  Cont to monitor   Reviewed echo          Atrial fibrillation with RVR  Admitted with A Fib with RVR   Cardizem drip   Continue B blocker   Cardiology Consult     12/10:  cardizem drip dc and transitioned to PO cardizem  Cont to monitor HR   pradaxa bid    12/11:  Rate controlled  Continue cardizem  PO pradaxa bid     12/15:  stable          VTE Risk Mitigation (From admission, onward)         Ordered     dabigatran etexilate capsule 150 mg  2 times daily      12/09/19 0342                       Michael Espinoza MD  Department of Hospital Medicine   [facility]

## 2019-12-18 NOTE — DISCHARGE SUMMARY
[facility] Hospital Medicine  Discharge Summary      Patient Name: Nicolás Marin  MRN: [id number]  Admission Date: 12/8/2019  Hospital Length of Stay: 8 days  Discharge Date and Time: 12/17/2019  1:45 PM  Attending Physician:  Michael Espinoza MD  Discharging Provider: Michael Espinoza MD  Primary Care Provider: Yuli Lau MD      HPI:   Mr Marin is a 79 year old male with PMHx of COPD on home O 2, HTN, A Fib on Pradaxa who presented to [facility] with complaints of progressive shortness of breath that has continue to worsen.  Denies associated symptoms chest pain, shortness of breath, fever, chills, nausea or vomiting. [facility] and is a poor historian. EKG in the Emergency Room showed A Fib with RVR rate in of 135, he was started of Cardizem drip in the Emergency Room. CT of chest showed bilateral plural effusions, Rt greater that left. . Troponin negative. He is a full code, granddaughter Katharine, is surrogate decision make. He is being admitted to [facility] under the care of [facility].      * No surgery found *      Hospital Course:   Admitted for evaluation and treatment of acute respiratory failure.  Respiratory failure due to exacerbation of chronic congestive heart failure.  Probably due to atrial fibrillation with rapid ventricular rate.  Rate controlled with metoprolol and diltiazem.  Respiratory support initially with continuous BiPAP then weaned to vapotherm.  Subsequently weaned to nasal cannula.  Respiratory status return to baseline.  He has a chronic severe hypercapnia with compensatory metabolic alkalosis.  PCO2 is consistently about 80 with a serum bicarbonate of approximately 40.  Case management assisted with disposition.  After investigation of options for skilled placement through the [facility] administration there would be no beds available for several weeks.  Discharge plan to return home with home health.  Add diltiazem to daily  medication regimen.  Follow-up with primary care provider in the next two weeks.     Consults:   Consults (From admission, onward)        Status Ordering Provider     Inpatient consult to Cardiology  Once     Provider:  Gonzalo Brito MD    Completed JUAN CHAVEZ     IP consult to case management  Once     Provider:  (Not yet assigned)    Completed MAGO GERMAN          No new Assessment & Plan notes have been filed under this hospital service since the last note was generated.  Service: Hospital Medicine    Final Active Diagnoses:    Diagnosis Date Noted POA    PRINCIPAL PROBLEM:  Acute on chronic respiratory failure with hypoxia [J96.21] 12/09/2019 Yes    Weakness [R53.1] 12/12/2019 Yes    Atrial fibrillation with RVR [I48.91] 12/09/2019 Yes    Acute on chronic congestive heart failure [I50.9] 12/09/2019 Yes    Essential hypertension [I10] 12/09/2019 Yes      Problems Resolved During this Admission:    Diagnosis Date Noted Date Resolved POA    HLD (hyperlipidemia) [E78.5] 12/09/2019 12/15/2019 Yes    Pleural effusion, bilateral [J90] 12/09/2019 12/15/2019 Yes       Discharged Condition: good    Disposition: Home-Health Care c    Follow Up:  Follow-up Information     Yuli Lau MD In 1 week.    Specialty:  Family Medicine  Why:  Hospital follow up atrial fibrillation with RVR and COPD exacerbation.  Contact information:  [address]  [place] [address] [phone]                 Patient Instructions:      Diet Cardiac     Activity as tolerated       Significant Diagnostic Studies: Labs: All labs within the past 24 hours have been reviewed    Pending Diagnostic Studies:     None         Medications:  Reconciled Home Medications:      Medication List      START taking these medications    diltiaZEM 180 MG 24 hr capsule  Commonly known as:  CARDIZEM CD  Take 1 capsule (180 mg total) by mouth once daily.        CONTINUE taking these medications    atorvastatin 40 MG tablet  Commonly known  "as:  LIPITOR  Take 40 mg by mouth once daily.     Combivent Respimat  mcg/actuation inhaler  Generic drug:  ipratropium-albuterol  Inhale 1 puff into the lungs every 6 (six) hours as needed for Wheezing. Rescue     finasteride 5 mg tablet  Commonly known as:  PROSCAR  Take 5 mg by mouth once daily.     guaiFENesin 600 mg 12 hr tablet  Commonly known as:  MUCINEX  Take 600 mg by mouth once daily.     metoprolol succinate 100 MG 24 hr tablet  Commonly known as:  TOPROL-XL  Take 100 mg by mouth once daily.     mometasone 220 mcg/ actuation (30) inhaler  Commonly known as:  ASMANEX TWISTHALER  Inhale 2 puffs into the lungs once daily. Controller     multivitamin per tablet  Commonly known as:  THERAGRAN  Take 1 tablet by mouth once daily.     omeprazole 20 MG capsule  Commonly known as:  PRILOSEC  Take 20 mg by mouth once daily.     Pradaxa 150 mg Cap  Generic drug:  dabigatran etexilate  Take 150 mg by mouth 2 (two) times daily. "Do NOT break, chew, or open capsules."     Proair Digihaler 90 mcg/actuation Aebs  Generic drug:  albuterol sulfate  Inhale into the lungs.     tamsulosin 0.4 mg Cap  Commonly known as:  FLOMAX  Take 0.4 mg by mouth once daily.     tiotropium 18 mcg inhalation capsule  Commonly known as:  SPIRIVA  Inhale 2.5 mcg into the lungs once daily. Controller            Indwelling Lines/Drains at time of discharge:   Lines/Drains/Airways     None                 Time spent on the discharge of patient: 30 minutes  Patient was seen and examined on the date of discharge and determined to be suitable for discharge.         Michael Espinoza MD  Department of Hospital Medicine  [facility]  "